# Patient Record
Sex: MALE | Race: OTHER | HISPANIC OR LATINO | URBAN - METROPOLITAN AREA
[De-identification: names, ages, dates, MRNs, and addresses within clinical notes are randomized per-mention and may not be internally consistent; named-entity substitution may affect disease eponyms.]

---

## 2019-05-09 ENCOUNTER — EMERGENCY (EMERGENCY)
Facility: HOSPITAL | Age: 44
LOS: 1 days | Discharge: HOME | End: 2019-05-09
Attending: EMERGENCY MEDICINE
Payer: MEDICARE

## 2019-05-09 VITALS
SYSTOLIC BLOOD PRESSURE: 115 MMHG | OXYGEN SATURATION: 99 % | RESPIRATION RATE: 18 BRPM | HEART RATE: 71 BPM | DIASTOLIC BLOOD PRESSURE: 71 MMHG | TEMPERATURE: 98 F

## 2019-05-09 PROCEDURE — 71046 X-RAY EXAM CHEST 2 VIEWS: CPT | Mod: 26

## 2019-05-09 PROCEDURE — 71046 X-RAY EXAM CHEST 2 VIEWS: CPT

## 2019-05-09 PROCEDURE — 99283 EMERGENCY DEPT VISIT LOW MDM: CPT

## 2019-05-09 PROCEDURE — 99283 EMERGENCY DEPT VISIT LOW MDM: CPT | Mod: 25

## 2019-05-09 RX ORDER — CETIRIZINE HYDROCHLORIDE 10 MG/1
1 TABLET ORAL
Qty: 7 | Refills: 0
Start: 2019-05-09 | End: 2019-05-15

## 2019-05-09 RX ORDER — FLUTICASONE PROPIONATE 50 MCG
1 SPRAY, SUSPENSION NASAL
Qty: 1 | Refills: 0
Start: 2019-05-09 | End: 2019-06-07

## 2019-05-09 RX ORDER — LORATADINE 10 MG/1
10 TABLET ORAL ONCE
Refills: 0 | Status: COMPLETED | OUTPATIENT
Start: 2019-05-09 | End: 2019-05-09

## 2019-05-09 RX ADMIN — LORATADINE 10 MILLIGRAM(S): 10 TABLET ORAL at 18:01

## 2019-05-09 NOTE — ED STATDOCS - PHYSICAL EXAMINATION
VITAL SIGNS: I have reviewed nursing notes and confirm.  CONSTITUTIONAL: Well-developed; well-nourished; in no acute distress.  SKIN: Skin exam is warm and dry, no acute rash.  HEAD: Normocephalic; atraumatic.  EYES: PERRL, EOM intact; conjunctiva and sclera clear.  ENT: (+) nasal discharge; airway clear. Throat clear.  NECK: Supple; non tender.    CARD: S1, S2 normal; no murmurs, gallops, or rubs. Regular rate and rhythm.  RESP: No wheezes,  no rales or rhonchi.   ABD:  soft; non-distended; non-tender;   EXT: Normal ROM. No clubbing, cyanosis or edema.  NEURO: Alert, oriented. Grossly unremarkable. No focal deficits. no facial droop, moves all extremities,    PSYCH: Cooperative, appropriate. VITAL SIGNS: I have reviewed nursing notes and confirm.  CONSTITUTIONAL: Well-developed; well-nourished; in no acute distress.  SKIN: Skin exam is warm and dry, no acute rash.  HEAD: Normocephalic; atraumatic.  EYES: PERRL, EOM intact; conjunctiva and sclera clear. (-) florescene take up.   ENT: (+) nasal discharge; airway clear. Throat clear.  NECK: Supple; non tender.    CARD: S1, S2 normal; no murmurs, gallops, or rubs. Regular rate and rhythm.  RESP: No wheezes,  no rales or rhonchi.   ABD:  soft; non-distended; non-tender;   EXT: Normal ROM. No clubbing, cyanosis or edema.  NEURO: Alert, oriented. Grossly unremarkable. No focal deficits. no facial droop, moves all extremities,    PSYCH: Cooperative, appropriate.

## 2019-05-09 NOTE — ED STATDOCS - OBJECTIVE STATEMENT
44 yo M deaf p/w 1.5 months of itchy and painful eye, running nose, yellow/green/black productive cough. No fever at at home. symptoms not relieved with over the counter cold medication. 2 weeks ago, patient states that something might have flown into his eyes. Family report that he has frequent episode like this throughout the year. He doesn't follow up with a PMD and allergist. No abdominal pain, no nausea, no vomting.     hx translated by deaf  at bedside. 44 yo M deaf p/w 1.5 months of itchy and painful eye, running nose, yellow/green/black productive cough. No fever at at home. symptoms not relieved with over the counter cold medication. 2 weeks ago, patient states that something might have flown into his eyes. Family report that he has frequent episode like this throughout the year. He doesn't follow up with a PMD and allergist. No abdominal pain, no nausea, no vomiting.     hx translated by deaf  at bedside.

## 2019-05-09 NOTE — ED STATDOCS - ATTENDING CONTRIBUTION TO CARE
I, Chris Ross, performed the initial face to face bedside interview with this patient regarding history of present illness, review of symptoms and relevant past medical, social and family history.  I completed an independent physical examination.  I was the initial provider who evaluated this patient. I have signed out the follow up of any pending tests (i.e. labs, radiological studies) to the ACP.  I have communicated the patient’s plan of care and disposition with the ACP.

## 2019-05-09 NOTE — ED STATDOCS - CLINICAL SUMMARY MEDICAL DECISION MAKING FREE TEXT BOX
42 yo M with b/l itchy eyes, running nose, cough, likely seasonal allergies. Will get CXR for productive cough. Will florescene eye to r/o corneal abrasion. Claritin ordered. 42 yo M with b/l itchy eyes, running nose, cough, likely seasonal allergies. Will get CXR for productive cough. No florescene up take. Claritin ordered.

## 2019-05-09 NOTE — ED STATDOCS - NS ED ROS FT
Review of Systems  •	CONSTITUTIONAL - no  fever, no diaphoresis, no weight change  •	SKIN - no rash  •	HEMATOLOGIC - no bleeding, no bruising  •	EYES - (+) eye pain, no blurred vision (+) itchy eyes   •	ENT - no change in hearing, no pain  •	RESPIRATORY - no shortness of breath, (+) cough  •	CARDIAC - no chest pain, no palpitations  •	GI - no abd pain, no nausea, no vomiting, no diarrhea, no constipation, no bleeding  •	GENITO-URINARY - no discharge, no dysuria; no hematuria,   •	ENDO - no polydypsia, no polyurea, no heat/no cold intolerance  •	MUSCULOSKELETAL - no joint pain, no swelling, no redness  •	NEUROLOGIC - no weakness, no headache, no anesthesia, no paresthesias  •	PSYCH - no anxiety, non suicidal, non homicidal, no hallucination, no depression

## 2019-08-29 NOTE — ED ADULT TRIAGE NOTE - ESI TRIAGE ACUITY LEVEL, MLM
Continuity of Care Form    Patient Name: Gil Linn   :  1974  MRN:  [id number]    Admit date:  2019  Discharge date:  ***    Code Status Order: Prior   Advance Directives:   [facility] Documentation     Date/Time Healthcare Directive Type of Healthcare Directive Copy in [address] Agent's Name Healthcare Agent's Phone Number    [phone]  Yes, patient has an advance directive for healthcare treatment  Living will  No, copy requested from family  --  --  --          Admitting Physician:  Kia Butler MD  PCP: Maya Malik MD    Discharging Nurse: [facility] Unit/Room#: 5345/9960-57  Discharging Unit Phone Number: ***    Emergency Contact:   Extended Emergency Contact Information  Primary Emergency Contact: [facility], [facility]  Address: [place], [place], [address] [place] [address] Phone: [phone]  Mobile Phone: [phone]  Relation: Spouse    Past Surgical History:  Past Surgical History:   Procedure Laterality Date    BRONCHOSCOPY N/A 2019    BRONCHOSCOPY ALVEOLAR LAVAGE performed by Yuly Owen MD at [address] [place] [address]  2019    BRONCHOSCOPY [facility] performed by Yuly Owen MD at [facility]  2019    POBA and cutting balloon to Diag 1   [facility]      now dentures upper and lower    PTCA  10/15/2018    BMS- 2.25 x 18 to Diag 1       Immunization History: There is no immunization history on file for this patient.     Active Problems:  Patient Active Problem List   Diagnosis Code    IgA nephropathy N02.8    Anxiety F41.9    HTN (hypertension) I10    Contusion of toe S90.129A    Tobacco use disorder F17.200    Lumbar disc disease M51.9    Arthritis of shoulder region, degenerative M19.019    DDD (degenerative disc disease), lumbosacral M51.37    hours ending 19 2233  No intake/output data recorded.     Safety Concerns:     [address] SCH[address]TE Safety Concerns:499872323}    Impairments/Disabilities:      [address] FRANCA Impairments/Disabilities:065504389}    Nutrition Therapy:  Current Nutrition Therapy:   [address] FRANCA Diet List:715971587}    Routes of Feeding: {CHP DME Other Feedings:316450771}  Liquids: {Slp liquid thickness:78666}  Daily Fluid Restriction: {CHP DME Yes amt example:400819746}  Last Modified Barium Swallow with Video (Video Swallowing Test): {Done Not Done NNR}    Treatments at the Time of Hospital Discharge:   Respiratory Treatments: ***  Oxygen Therapy:  {Therapy; copd oxygen:61856}  Ventilator:    { CC Vent ILQD:092159888}    Rehab Therapies: {THERAPEUTIC INTERVENTION:8217033009}  Weight Bearing Status/Restrictions: Lilly Larson  Weight Bearin}  Other Medical Equipment (for information only, NOT a DME order):  {EQUIPMENT:598801715}  Other Treatments: ***    Patient's personal belongings (please select all that are sent with patient):  {CHP DME Belongings:545618779}    RN SIGNATURE:  {Esignature:937745127}    CASE MANAGEMENT/SOCIAL WORK SECTION    Inpatient Status Date: ***    Readmission Risk Assessment Score:  Readmission Risk              Risk of Unplanned Readmission:        0           Discharging to Facility/ Agency   · Name:   · Address:  · Phone:  · Fax:    Dialysis Facility (if applicable)   · Name:  · Address:  · Dialysis Schedule:  · Phone:  · Fax:    / signature: {Esignature:496426124}    PHYSICIAN SECTION    Prognosis: {Prognosis:5693484340}    Condition at Discharge: [address] Larson Patient Condition:248154685}    Rehab Potential (if transferring to Rehab): {Prognosis:0281195003}    Recommended Labs or Other Treatments After Discharge: ***    Physician Certification: I certify the above information and transfer of Mine Avery  is necessary for the continuing treatment of the diagnosis listed and that he 4

## 2020-10-30 ENCOUNTER — EMERGENCY (EMERGENCY)
Facility: HOSPITAL | Age: 45
LOS: 1 days | Discharge: HOME | End: 2020-10-30
Attending: STUDENT IN AN ORGANIZED HEALTH CARE EDUCATION/TRAINING PROGRAM
Payer: SELF-PAY

## 2020-10-30 VITALS
HEIGHT: 66 IN | DIASTOLIC BLOOD PRESSURE: 85 MMHG | RESPIRATION RATE: 20 BRPM | HEART RATE: 80 BPM | TEMPERATURE: 98 F | OXYGEN SATURATION: 95 % | WEIGHT: 210.1 LBS | SYSTOLIC BLOOD PRESSURE: 151 MMHG

## 2020-10-30 PROCEDURE — 99284 EMERGENCY DEPT VISIT MOD MDM: CPT | Mod: 25

## 2020-10-30 PROCEDURE — 70450 CT HEAD/BRAIN W/O DYE: CPT

## 2020-10-30 PROCEDURE — 73590 X-RAY EXAM OF LOWER LEG: CPT | Mod: 26,RT

## 2020-10-30 PROCEDURE — 70450 CT HEAD/BRAIN W/O DYE: CPT | Mod: 26

## 2020-10-30 PROCEDURE — 73030 X-RAY EXAM OF SHOULDER: CPT

## 2020-10-30 PROCEDURE — 99285 EMERGENCY DEPT VISIT HI MDM: CPT

## 2020-10-30 PROCEDURE — 73590 X-RAY EXAM OF LOWER LEG: CPT

## 2020-10-30 PROCEDURE — 73030 X-RAY EXAM OF SHOULDER: CPT | Mod: 26,LT

## 2020-10-30 RX ORDER — ACETAMINOPHEN 500 MG
650 TABLET ORAL ONCE
Refills: 0 | Status: COMPLETED | OUTPATIENT
Start: 2020-10-30 | End: 2020-10-30

## 2020-10-30 RX ADMIN — Medication 650 MILLIGRAM(S): at 21:57
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
## 2020-10-30 NOTE — ED ADULT NURSE NOTE - OBJECTIVE STATEMENT
Pt complaining of lower extremity and head pain. Pt denies chest pain SOB. Family member used for interpretation Pt is deaf mute. Medications given as per orders will continue to monitor. Pt breathing even and unlabored. Pt in no acute distress.

## 2020-10-30 NOTE — ED ADULT TRIAGE NOTE - NS ED NURSE BANDS TYPE
Dr. Stewart- Is there anything specific you would like this patient to do? She just wanted to update you.   Name band;

## 2020-10-30 NOTE — ED PROVIDER NOTE - PATIENT PORTAL LINK FT
You can access the FollowMyHealth Patient Portal offered by [facility] by registering at the following website: [email or web address]. By joining [employer]’s FollowMyHealth portal, you will also be able to view your health information using other applications (apps) compatible with our system.
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
## 2020-10-30 NOTE — ED PROVIDER NOTE - PHYSICAL EXAMINATION
General: no fever, A&O x 3  HEENT: airway patent  Respiratory: lungs CTA bilaterally  Cardiac: S1S2, regular rate and rhythm  Abdomen: abdomen non-tender  Musculoskeletal: no midline back tenderness, no C-spine tenderness, full ROM, no deformity, + tenderness of right lower tibia  Neuro: 5/5 motor strength in all extremities, CN II-XII intact  Skin: no lesions or rashes

## 2020-10-31 VITALS
HEART RATE: 85 BPM | SYSTOLIC BLOOD PRESSURE: 124 MMHG | DIASTOLIC BLOOD PRESSURE: 78 MMHG | RESPIRATION RATE: 20 BRPM | OXYGEN SATURATION: 100 % | TEMPERATURE: 98 F

## 2021-01-26 NOTE — ED ADULT NURSE NOTE - CHIEF COMPLAINT
The patient is a 45y Male complaining of fall. Island Pedicle Flap With Canthal Suspension Text: The defect edges were debeveled with a #15 scalpel blade.  Given the location of the defect, shape of the defect and the proximity to free margins an island pedicle advancement flap was deemed most appropriate.  Using a sterile surgical marker, an appropriate advancement flap was drawn incorporating the defect, outlining the appropriate donor tissue and placing the expected incisions within the relaxed skin tension lines where possible. The area thus outlined was incised deep to adipose tissue with a #15 scalpel blade.  The skin margins were undermined to an appropriate distance in all directions around the primary defect and laterally outward around the island pedicle utilizing iris scissors.  There was minimal undermining beneath the pedicle flap. A suspension suture was placed in the canthal tendon to prevent tension and prevent ectropion.

## 2023-01-01 ENCOUNTER — INPATIENT (INPATIENT)
Facility: HOSPITAL | Age: 48
LOS: 3 days | DRG: 207 | End: 2023-01-17
Attending: HOSPITALIST | Admitting: HOSPITALIST
Payer: COMMERCIAL

## 2023-01-01 VITALS
OXYGEN SATURATION: 58 % | HEART RATE: 64 BPM | SYSTOLIC BLOOD PRESSURE: 171 MMHG | DIASTOLIC BLOOD PRESSURE: 144 MMHG | RESPIRATION RATE: 16 BRPM

## 2023-01-01 VITALS
DIASTOLIC BLOOD PRESSURE: 59 MMHG | RESPIRATION RATE: 16 BRPM | HEART RATE: 71 BPM | SYSTOLIC BLOOD PRESSURE: 81 MMHG | OXYGEN SATURATION: 95 %

## 2023-01-01 DIAGNOSIS — I46.9 CARDIAC ARREST, CAUSE UNSPECIFIED: ICD-10-CM

## 2023-01-01 LAB
A1C WITH ESTIMATED AVERAGE GLUCOSE RESULT: 5.8 % — HIGH (ref 4–5.6)
ALBUMIN SERPL ELPH-MCNC: 2.5 G/DL — LOW (ref 3.3–5.2)
ALBUMIN SERPL ELPH-MCNC: 2.6 G/DL — LOW (ref 3.3–5.2)
ALBUMIN SERPL ELPH-MCNC: 2.6 G/DL — LOW (ref 3.3–5.2)
ALBUMIN SERPL ELPH-MCNC: 2.8 G/DL — LOW (ref 3.3–5.2)
ALBUMIN SERPL ELPH-MCNC: 3.2 G/DL — LOW (ref 3.3–5.2)
ALBUMIN SERPL ELPH-MCNC: 4 G/DL — SIGNIFICANT CHANGE UP (ref 3.3–5.2)
ALBUMIN SERPL ELPH-MCNC: 4 G/DL — SIGNIFICANT CHANGE UP (ref 3.3–5.2)
ALP SERPL-CCNC: 101 U/L — SIGNIFICANT CHANGE UP (ref 40–120)
ALP SERPL-CCNC: 110 U/L — SIGNIFICANT CHANGE UP (ref 40–120)
ALP SERPL-CCNC: 125 U/L — HIGH (ref 40–120)
ALP SERPL-CCNC: 287 U/L — HIGH (ref 40–120)
ALP SERPL-CCNC: 69 U/L — SIGNIFICANT CHANGE UP (ref 40–120)
ALP SERPL-CCNC: 86 U/L — SIGNIFICANT CHANGE UP (ref 40–120)
ALP SERPL-CCNC: 98 U/L — SIGNIFICANT CHANGE UP (ref 40–120)
ALT FLD-CCNC: 1054 U/L — HIGH
ALT FLD-CCNC: 1058 U/L — HIGH
ALT FLD-CCNC: 1129 U/L — HIGH
ALT FLD-CCNC: 1277 U/L — HIGH
ALT FLD-CCNC: 224 U/L — HIGH
ALT FLD-CCNC: 586 U/L — HIGH
ALT FLD-CCNC: 878 U/L — HIGH
ALT FLD-CCNC: 932 U/L — HIGH
ALT FLD-CCNC: 973 U/L — HIGH
AMPHET UR-MCNC: NEGATIVE — SIGNIFICANT CHANGE UP
ANION GAP SERPL CALC-SCNC: 11 MMOL/L — SIGNIFICANT CHANGE UP (ref 5–17)
ANION GAP SERPL CALC-SCNC: 15 MMOL/L — SIGNIFICANT CHANGE UP (ref 5–17)
ANION GAP SERPL CALC-SCNC: 15 MMOL/L — SIGNIFICANT CHANGE UP (ref 5–17)
ANION GAP SERPL CALC-SCNC: 16 MMOL/L — SIGNIFICANT CHANGE UP (ref 5–17)
ANION GAP SERPL CALC-SCNC: 16 MMOL/L — SIGNIFICANT CHANGE UP (ref 5–17)
ANION GAP SERPL CALC-SCNC: 18 MMOL/L — HIGH (ref 5–17)
ANION GAP SERPL CALC-SCNC: 19 MMOL/L — HIGH (ref 5–17)
ANION GAP SERPL CALC-SCNC: 22 MMOL/L — HIGH (ref 5–17)
ANION GAP SERPL CALC-SCNC: 26 MMOL/L — HIGH (ref 5–17)
ANION GAP SERPL CALC-SCNC: 37 MMOL/L — HIGH (ref 5–17)
APPEARANCE UR: CLEAR — SIGNIFICANT CHANGE UP
APTT BLD: 53.3 SEC — HIGH (ref 27.5–35.5)
AST SERPL-CCNC: 1015 U/L — HIGH
AST SERPL-CCNC: 1066 U/L — HIGH
AST SERPL-CCNC: 1139 U/L — HIGH
AST SERPL-CCNC: 1196 U/L — HIGH
AST SERPL-CCNC: 1416 U/L — HIGH
AST SERPL-CCNC: 192 U/L — HIGH
AST SERPL-CCNC: 225 U/L — HIGH
AST SERPL-CCNC: 457 U/L — HIGH
AST SERPL-CCNC: 529 U/L — HIGH
BACTERIA # UR AUTO: NEGATIVE — SIGNIFICANT CHANGE UP
BARBITURATES UR SCN-MCNC: NEGATIVE — SIGNIFICANT CHANGE UP
BASE EXCESS BLDV CALC-SCNC: SIGNIFICANT CHANGE UP MMOL/L (ref -2–3)
BASOPHILS # BLD AUTO: 0 K/UL — SIGNIFICANT CHANGE UP (ref 0–0.2)
BASOPHILS # BLD AUTO: 0 K/UL — SIGNIFICANT CHANGE UP (ref 0–0.2)
BASOPHILS NFR BLD AUTO: 0 % — SIGNIFICANT CHANGE UP (ref 0–2)
BASOPHILS NFR BLD AUTO: 0 % — SIGNIFICANT CHANGE UP (ref 0–2)
BENZODIAZ UR-MCNC: NEGATIVE — SIGNIFICANT CHANGE UP
BILIRUB SERPL-MCNC: 0.4 MG/DL — SIGNIFICANT CHANGE UP (ref 0.4–2)
BILIRUB SERPL-MCNC: 0.9 MG/DL — SIGNIFICANT CHANGE UP (ref 0.4–2)
BILIRUB SERPL-MCNC: 1.4 MG/DL — SIGNIFICANT CHANGE UP (ref 0.4–2)
BILIRUB SERPL-MCNC: 1.7 MG/DL — SIGNIFICANT CHANGE UP (ref 0.4–2)
BILIRUB SERPL-MCNC: 1.9 MG/DL — SIGNIFICANT CHANGE UP (ref 0.4–2)
BILIRUB SERPL-MCNC: 2.2 MG/DL — HIGH (ref 0.4–2)
BILIRUB SERPL-MCNC: 2.8 MG/DL — HIGH (ref 0.4–2)
BILIRUB SERPL-MCNC: 2.9 MG/DL — HIGH (ref 0.4–2)
BILIRUB SERPL-MCNC: <0.2 MG/DL — LOW (ref 0.4–2)
BILIRUB UR-MCNC: NEGATIVE — SIGNIFICANT CHANGE UP
BLD GP AB SCN SERPL QL: SIGNIFICANT CHANGE UP
BUN SERPL-MCNC: 11.4 MG/DL — SIGNIFICANT CHANGE UP (ref 8–20)
BUN SERPL-MCNC: 14.3 MG/DL — SIGNIFICANT CHANGE UP (ref 8–20)
BUN SERPL-MCNC: 27.2 MG/DL — HIGH (ref 8–20)
BUN SERPL-MCNC: 34 MG/DL — HIGH (ref 8–20)
BUN SERPL-MCNC: 37.3 MG/DL — HIGH (ref 8–20)
BUN SERPL-MCNC: 46.2 MG/DL — HIGH (ref 8–20)
BUN SERPL-MCNC: 50.6 MG/DL — HIGH (ref 8–20)
BUN SERPL-MCNC: 63.6 MG/DL — HIGH (ref 8–20)
BUN SERPL-MCNC: 66.4 MG/DL — HIGH (ref 8–20)
BUN SERPL-MCNC: 74.4 MG/DL — HIGH (ref 8–20)
BURR CELLS BLD QL SMEAR: PRESENT — SIGNIFICANT CHANGE UP
CA-I SERPL-SCNC: 1.15 MMOL/L — SIGNIFICANT CHANGE UP (ref 1.15–1.33)
CALCIUM SERPL-MCNC: 6.2 MG/DL — CRITICAL LOW (ref 8.4–10.5)
CALCIUM SERPL-MCNC: 6.4 MG/DL — CRITICAL LOW (ref 8.4–10.5)
CALCIUM SERPL-MCNC: 6.5 MG/DL — CRITICAL LOW (ref 8.4–10.5)
CALCIUM SERPL-MCNC: 6.6 MG/DL — LOW (ref 8.4–10.5)
CALCIUM SERPL-MCNC: 6.7 MG/DL — LOW (ref 8.4–10.5)
CALCIUM SERPL-MCNC: 6.8 MG/DL — LOW (ref 8.4–10.5)
CALCIUM SERPL-MCNC: 7 MG/DL — LOW (ref 8.4–10.5)
CALCIUM SERPL-MCNC: 7.3 MG/DL — LOW (ref 8.4–10.5)
CALCIUM SERPL-MCNC: 7.4 MG/DL — LOW (ref 8.4–10.5)
CALCIUM SERPL-MCNC: 9.2 MG/DL — SIGNIFICANT CHANGE UP (ref 8.4–10.5)
CHLORIDE BLDV-SCNC: 103 MMOL/L — SIGNIFICANT CHANGE UP (ref 96–108)
CHLORIDE SERPL-SCNC: 101 MMOL/L — SIGNIFICANT CHANGE UP (ref 96–108)
CHLORIDE SERPL-SCNC: 103 MMOL/L — SIGNIFICANT CHANGE UP (ref 96–108)
CHLORIDE SERPL-SCNC: 105 MMOL/L — SIGNIFICANT CHANGE UP (ref 96–108)
CHLORIDE SERPL-SCNC: 105 MMOL/L — SIGNIFICANT CHANGE UP (ref 96–108)
CHLORIDE SERPL-SCNC: 106 MMOL/L — SIGNIFICANT CHANGE UP (ref 96–108)
CHLORIDE SERPL-SCNC: 95 MMOL/L — LOW (ref 96–108)
CHLORIDE SERPL-SCNC: 95 MMOL/L — LOW (ref 96–108)
CHLORIDE SERPL-SCNC: 97 MMOL/L — SIGNIFICANT CHANGE UP (ref 96–108)
CHLORIDE SERPL-SCNC: 97 MMOL/L — SIGNIFICANT CHANGE UP (ref 96–108)
CHLORIDE SERPL-SCNC: 99 MMOL/L — SIGNIFICANT CHANGE UP (ref 96–108)
CK SERPL-CCNC: 1188 U/L — HIGH (ref 30–200)
CO2 SERPL-SCNC: 10 MMOL/L — CRITICAL LOW (ref 22–29)
CO2 SERPL-SCNC: 14 MMOL/L — LOW (ref 22–29)
CO2 SERPL-SCNC: 16 MMOL/L — LOW (ref 22–29)
CO2 SERPL-SCNC: 21 MMOL/L — LOW (ref 22–29)
CO2 SERPL-SCNC: 25 MMOL/L — SIGNIFICANT CHANGE UP (ref 22–29)
CO2 SERPL-SCNC: 31 MMOL/L — HIGH (ref 22–29)
CO2 SERPL-SCNC: 33 MMOL/L — HIGH (ref 22–29)
COCAINE METAB.OTHER UR-MCNC: NEGATIVE — SIGNIFICANT CHANGE UP
COLOR SPEC: YELLOW — SIGNIFICANT CHANGE UP
CREAT SERPL-MCNC: 1.97 MG/DL — HIGH (ref 0.5–1.3)
CREAT SERPL-MCNC: 2.05 MG/DL — HIGH (ref 0.5–1.3)
CREAT SERPL-MCNC: 2.37 MG/DL — HIGH (ref 0.5–1.3)
CREAT SERPL-MCNC: 2.67 MG/DL — HIGH (ref 0.5–1.3)
CREAT SERPL-MCNC: 2.95 MG/DL — HIGH (ref 0.5–1.3)
CREAT SERPL-MCNC: 3.66 MG/DL — HIGH (ref 0.5–1.3)
CREAT SERPL-MCNC: 4.25 MG/DL — HIGH (ref 0.5–1.3)
CREAT SERPL-MCNC: 5.02 MG/DL — HIGH (ref 0.5–1.3)
CREAT SERPL-MCNC: 5.03 MG/DL — HIGH (ref 0.5–1.3)
CREAT SERPL-MCNC: 5.86 MG/DL — HIGH (ref 0.5–1.3)
CULTURE RESULTS: NO GROWTH — SIGNIFICANT CHANGE UP
DIFF PNL FLD: ABNORMAL
EGFR: 11 ML/MIN/1.73M2 — LOW
EGFR: 13 ML/MIN/1.73M2 — LOW
EGFR: 13 ML/MIN/1.73M2 — LOW
EGFR: 16 ML/MIN/1.73M2 — LOW
EGFR: 19 ML/MIN/1.73M2 — LOW
EGFR: 25 ML/MIN/1.73M2 — LOW
EGFR: 28 ML/MIN/1.73M2 — LOW
EGFR: 33 ML/MIN/1.73M2 — LOW
EGFR: 39 ML/MIN/1.73M2 — LOW
EGFR: 41 ML/MIN/1.73M2 — LOW
EOSINOPHIL # BLD AUTO: 0 K/UL — SIGNIFICANT CHANGE UP (ref 0–0.5)
EOSINOPHIL # BLD AUTO: 0.11 K/UL — SIGNIFICANT CHANGE UP (ref 0–0.5)
EOSINOPHIL NFR BLD AUTO: 0 % — SIGNIFICANT CHANGE UP (ref 0–6)
EOSINOPHIL NFR BLD AUTO: 0.9 % — SIGNIFICANT CHANGE UP (ref 0–6)
EPI CELLS # UR: NEGATIVE — SIGNIFICANT CHANGE UP
ESTIMATED AVERAGE GLUCOSE: 120 MG/DL — HIGH (ref 68–114)
ETHANOL SERPL-MCNC: 286 MG/DL — HIGH (ref 0–9)
GAS PNL BLDA: SIGNIFICANT CHANGE UP
GAS PNL BLDV: 144 MMOL/L — SIGNIFICANT CHANGE UP (ref 136–145)
GAS PNL BLDV: SIGNIFICANT CHANGE UP
GLUCOSE BLDC GLUCOMTR-MCNC: 104 MG/DL — HIGH (ref 70–99)
GLUCOSE BLDC GLUCOMTR-MCNC: 134 MG/DL — HIGH (ref 70–99)
GLUCOSE BLDC GLUCOMTR-MCNC: 138 MG/DL — HIGH (ref 70–99)
GLUCOSE BLDC GLUCOMTR-MCNC: 140 MG/DL — HIGH (ref 70–99)
GLUCOSE BLDC GLUCOMTR-MCNC: 148 MG/DL — HIGH (ref 70–99)
GLUCOSE BLDC GLUCOMTR-MCNC: 149 MG/DL — HIGH (ref 70–99)
GLUCOSE BLDC GLUCOMTR-MCNC: 154 MG/DL — HIGH (ref 70–99)
GLUCOSE BLDC GLUCOMTR-MCNC: 156 MG/DL — HIGH (ref 70–99)
GLUCOSE BLDC GLUCOMTR-MCNC: 172 MG/DL — HIGH (ref 70–99)
GLUCOSE BLDC GLUCOMTR-MCNC: 172 MG/DL — HIGH (ref 70–99)
GLUCOSE BLDC GLUCOMTR-MCNC: 177 MG/DL — HIGH (ref 70–99)
GLUCOSE BLDC GLUCOMTR-MCNC: 184 MG/DL — HIGH (ref 70–99)
GLUCOSE BLDC GLUCOMTR-MCNC: 185 MG/DL — HIGH (ref 70–99)
GLUCOSE BLDC GLUCOMTR-MCNC: 186 MG/DL — HIGH (ref 70–99)
GLUCOSE BLDC GLUCOMTR-MCNC: 189 MG/DL — HIGH (ref 70–99)
GLUCOSE BLDC GLUCOMTR-MCNC: 193 MG/DL — HIGH (ref 70–99)
GLUCOSE BLDC GLUCOMTR-MCNC: 195 MG/DL — HIGH (ref 70–99)
GLUCOSE BLDC GLUCOMTR-MCNC: 201 MG/DL — HIGH (ref 70–99)
GLUCOSE BLDC GLUCOMTR-MCNC: 201 MG/DL — HIGH (ref 70–99)
GLUCOSE BLDC GLUCOMTR-MCNC: 202 MG/DL — HIGH (ref 70–99)
GLUCOSE BLDC GLUCOMTR-MCNC: 205 MG/DL — HIGH (ref 70–99)
GLUCOSE BLDC GLUCOMTR-MCNC: 205 MG/DL — HIGH (ref 70–99)
GLUCOSE BLDC GLUCOMTR-MCNC: 217 MG/DL — HIGH (ref 70–99)
GLUCOSE BLDC GLUCOMTR-MCNC: 237 MG/DL — HIGH (ref 70–99)
GLUCOSE BLDC GLUCOMTR-MCNC: 249 MG/DL — HIGH (ref 70–99)
GLUCOSE BLDC GLUCOMTR-MCNC: 258 MG/DL — HIGH (ref 70–99)
GLUCOSE BLDC GLUCOMTR-MCNC: 265 MG/DL — HIGH (ref 70–99)
GLUCOSE BLDC GLUCOMTR-MCNC: 270 MG/DL — HIGH (ref 70–99)
GLUCOSE BLDC GLUCOMTR-MCNC: 290 MG/DL — HIGH (ref 70–99)
GLUCOSE BLDC GLUCOMTR-MCNC: 293 MG/DL — HIGH (ref 70–99)
GLUCOSE BLDC GLUCOMTR-MCNC: 310 MG/DL — HIGH (ref 70–99)
GLUCOSE BLDC GLUCOMTR-MCNC: 318 MG/DL — HIGH (ref 70–99)
GLUCOSE BLDC GLUCOMTR-MCNC: 334 MG/DL — HIGH (ref 70–99)
GLUCOSE BLDV-MCNC: 360 MG/DL — HIGH (ref 70–99)
GLUCOSE SERPL-MCNC: 155 MG/DL — HIGH (ref 70–99)
GLUCOSE SERPL-MCNC: 180 MG/DL — HIGH (ref 70–99)
GLUCOSE SERPL-MCNC: 195 MG/DL — HIGH (ref 70–99)
GLUCOSE SERPL-MCNC: 227 MG/DL — HIGH (ref 70–99)
GLUCOSE SERPL-MCNC: 231 MG/DL — HIGH (ref 70–99)
GLUCOSE SERPL-MCNC: 263 MG/DL — HIGH (ref 70–99)
GLUCOSE SERPL-MCNC: 264 MG/DL — HIGH (ref 70–99)
GLUCOSE SERPL-MCNC: 327 MG/DL — HIGH (ref 70–99)
GLUCOSE SERPL-MCNC: 338 MG/DL — HIGH (ref 70–99)
GLUCOSE SERPL-MCNC: 356 MG/DL — HIGH (ref 70–99)
GLUCOSE UR QL: NEGATIVE MG/DL — SIGNIFICANT CHANGE UP
HAV IGM SER-ACNC: SIGNIFICANT CHANGE UP
HBV CORE IGM SER-ACNC: SIGNIFICANT CHANGE UP
HBV SURFACE AG SER-ACNC: SIGNIFICANT CHANGE UP
HCO3 BLDV-SCNC: SIGNIFICANT CHANGE UP MMOL/L (ref 22–29)
HCT VFR BLD CALC: 36 % — LOW (ref 39–50)
HCT VFR BLD CALC: 36.8 % — LOW (ref 39–50)
HCT VFR BLD CALC: 37.2 % — LOW (ref 39–50)
HCT VFR BLD CALC: 42.9 % — SIGNIFICANT CHANGE UP (ref 39–50)
HCT VFR BLD CALC: 45 % — SIGNIFICANT CHANGE UP (ref 39–50)
HCT VFR BLD CALC: 49.2 % — SIGNIFICANT CHANGE UP (ref 39–50)
HCV AB S/CO SERPL IA: 0.13 S/CO — SIGNIFICANT CHANGE UP (ref 0–0.99)
HCV AB SERPL-IMP: SIGNIFICANT CHANGE UP
HGB BLD-MCNC: 12.3 G/DL — LOW (ref 13–17)
HGB BLD-MCNC: 12.9 G/DL — LOW (ref 13–17)
HGB BLD-MCNC: 13.2 G/DL — SIGNIFICANT CHANGE UP (ref 13–17)
HGB BLD-MCNC: 15.3 G/DL — SIGNIFICANT CHANGE UP (ref 13–17)
HGB BLD-MCNC: 15.4 G/DL — SIGNIFICANT CHANGE UP (ref 13–17)
HGB BLD-MCNC: 15.9 G/DL — SIGNIFICANT CHANGE UP (ref 13–17)
HIV 1 & 2 AB SERPL IA.RAPID: SIGNIFICANT CHANGE UP
INR BLD: 1.12 RATIO — SIGNIFICANT CHANGE UP (ref 0.88–1.16)
KETONES UR-MCNC: NEGATIVE — SIGNIFICANT CHANGE UP
LACTATE BLDV-MCNC: >16.5 MMOL/L — CRITICAL HIGH (ref 0.5–2)
LACTATE SERPL-SCNC: 3.5 MMOL/L — HIGH (ref 0.5–2)
LACTATE SERPL-SCNC: 4 MMOL/L — CRITICAL HIGH (ref 0.5–2)
LACTATE SERPL-SCNC: 4.5 MMOL/L — CRITICAL HIGH (ref 0.5–2)
LACTATE SERPL-SCNC: 4.6 MMOL/L — CRITICAL HIGH (ref 0.5–2)
LACTATE SERPL-SCNC: 5 MMOL/L — CRITICAL HIGH (ref 0.5–2)
LACTATE SERPL-SCNC: 5.2 MMOL/L — CRITICAL HIGH (ref 0.5–2)
LACTATE SERPL-SCNC: 5.7 MMOL/L — CRITICAL HIGH (ref 0.5–2)
LACTATE SERPL-SCNC: 8 MMOL/L — CRITICAL HIGH (ref 0.5–2)
LACTATE SERPL-SCNC: 8.3 MMOL/L — CRITICAL HIGH (ref 0.5–2)
LACTATE SERPL-SCNC: 8.4 MMOL/L — CRITICAL HIGH (ref 0.5–2)
LEUKOCYTE ESTERASE UR-ACNC: NEGATIVE — SIGNIFICANT CHANGE UP
LYMPHOCYTES # BLD AUTO: 0.46 K/UL — LOW (ref 1–3.3)
LYMPHOCYTES # BLD AUTO: 3.6 % — LOW (ref 13–44)
LYMPHOCYTES # BLD AUTO: 41.2 % — SIGNIFICANT CHANGE UP (ref 13–44)
LYMPHOCYTES # BLD AUTO: 6.61 K/UL — HIGH (ref 1–3.3)
MAGNESIUM SERPL-MCNC: 1.5 MG/DL — LOW (ref 1.6–2.6)
MAGNESIUM SERPL-MCNC: 1.5 MG/DL — LOW (ref 1.6–2.6)
MAGNESIUM SERPL-MCNC: 1.8 MG/DL — SIGNIFICANT CHANGE UP (ref 1.6–2.6)
MAGNESIUM SERPL-MCNC: 2.3 MG/DL — SIGNIFICANT CHANGE UP (ref 1.6–2.6)
MAGNESIUM SERPL-MCNC: 2.8 MG/DL — HIGH (ref 1.6–2.6)
MANUAL SMEAR VERIFICATION: SIGNIFICANT CHANGE UP
MCHC RBC-ENTMCNC: 31.3 GM/DL — LOW (ref 32–36)
MCHC RBC-ENTMCNC: 33.2 PG — SIGNIFICANT CHANGE UP (ref 27–34)
MCHC RBC-ENTMCNC: 33.4 PG — SIGNIFICANT CHANGE UP (ref 27–34)
MCHC RBC-ENTMCNC: 33.7 PG — SIGNIFICANT CHANGE UP (ref 27–34)
MCHC RBC-ENTMCNC: 33.8 PG — SIGNIFICANT CHANGE UP (ref 27–34)
MCHC RBC-ENTMCNC: 34.2 GM/DL — SIGNIFICANT CHANGE UP (ref 32–36)
MCHC RBC-ENTMCNC: 35.1 GM/DL — SIGNIFICANT CHANGE UP (ref 32–36)
MCHC RBC-ENTMCNC: 35.3 GM/DL — SIGNIFICANT CHANGE UP (ref 32–36)
MCHC RBC-ENTMCNC: 35.5 GM/DL — SIGNIFICANT CHANGE UP (ref 32–36)
MCHC RBC-ENTMCNC: 35.7 GM/DL — SIGNIFICANT CHANGE UP (ref 32–36)
MCV RBC AUTO: 106.7 FL — HIGH (ref 80–100)
MCV RBC AUTO: 93.5 FL — SIGNIFICANT CHANGE UP (ref 80–100)
MCV RBC AUTO: 94.5 FL — SIGNIFICANT CHANGE UP (ref 80–100)
MCV RBC AUTO: 94.8 FL — SIGNIFICANT CHANGE UP (ref 80–100)
MCV RBC AUTO: 95.5 FL — SIGNIFICANT CHANGE UP (ref 80–100)
MCV RBC AUTO: 97.3 FL — SIGNIFICANT CHANGE UP (ref 80–100)
METAMYELOCYTES # FLD: 3.5 % — HIGH (ref 0–0)
METHADONE UR-MCNC: NEGATIVE — SIGNIFICANT CHANGE UP
MONOCYTES # BLD AUTO: 0.23 K/UL — SIGNIFICANT CHANGE UP (ref 0–0.9)
MONOCYTES # BLD AUTO: 0.71 K/UL — SIGNIFICANT CHANGE UP (ref 0–0.9)
MONOCYTES NFR BLD AUTO: 1.8 % — LOW (ref 2–14)
MONOCYTES NFR BLD AUTO: 4.4 % — SIGNIFICANT CHANGE UP (ref 2–14)
MRSA PCR RESULT.: SIGNIFICANT CHANGE UP
MYELOCYTES NFR BLD: 3.5 % — HIGH (ref 0–0)
NEUTROPHILS # BLD AUTO: 11.84 K/UL — HIGH (ref 1.8–7.4)
NEUTROPHILS # BLD AUTO: 7.61 K/UL — HIGH (ref 1.8–7.4)
NEUTROPHILS NFR BLD AUTO: 47.4 % — SIGNIFICANT CHANGE UP (ref 43–77)
NEUTROPHILS NFR BLD AUTO: 92.8 % — HIGH (ref 43–77)
NITRITE UR-MCNC: NEGATIVE — SIGNIFICANT CHANGE UP
NRBC # BLD: 2 /100 — HIGH (ref 0–0)
NT-PROBNP SERPL-SCNC: 80 PG/ML — SIGNIFICANT CHANGE UP (ref 0–300)
OPIATES UR-MCNC: NEGATIVE — SIGNIFICANT CHANGE UP
PCO2 BLDV: 89 MMHG — HIGH (ref 42–55)
PCP SPEC-MCNC: SIGNIFICANT CHANGE UP
PCP UR-MCNC: NEGATIVE — SIGNIFICANT CHANGE UP
PH BLDV: <6.942 — CRITICAL LOW (ref 7.32–7.43)
PH UR: 6 — SIGNIFICANT CHANGE UP (ref 5–8)
PHOSPHATE SERPL-MCNC: 0.6 MG/DL — CRITICAL LOW (ref 2.4–4.7)
PHOSPHATE SERPL-MCNC: 10.1 MG/DL — HIGH (ref 2.4–4.7)
PHOSPHATE SERPL-MCNC: 2.1 MG/DL — LOW (ref 2.4–4.7)
PHOSPHATE SERPL-MCNC: 3 MG/DL — SIGNIFICANT CHANGE UP (ref 2.4–4.7)
PHOSPHATE SERPL-MCNC: 4.5 MG/DL — SIGNIFICANT CHANGE UP (ref 2.4–4.7)
PHOSPHATE SERPL-MCNC: 4.8 MG/DL — HIGH (ref 2.4–4.7)
PHOSPHATE SERPL-MCNC: 5.1 MG/DL — HIGH (ref 2.4–4.7)
PHOSPHATE SERPL-MCNC: 5.3 MG/DL — HIGH (ref 2.4–4.7)
PLAT MORPH BLD: NORMAL — SIGNIFICANT CHANGE UP
PLATELET # BLD AUTO: 137 K/UL — LOW (ref 150–400)
PLATELET # BLD AUTO: 164 K/UL — SIGNIFICANT CHANGE UP (ref 150–400)
PLATELET # BLD AUTO: 184 K/UL — SIGNIFICANT CHANGE UP (ref 150–400)
PLATELET # BLD AUTO: 56 K/UL — LOW (ref 150–400)
PLATELET # BLD AUTO: 61 K/UL — LOW (ref 150–400)
PLATELET # BLD AUTO: 83 K/UL — LOW (ref 150–400)
PO2 BLDV: 117 MMHG — HIGH (ref 25–45)
POLYCHROMASIA BLD QL SMEAR: SIGNIFICANT CHANGE UP
POTASSIUM BLDV-SCNC: 4.6 MMOL/L — SIGNIFICANT CHANGE UP (ref 3.5–5.1)
POTASSIUM SERPL-MCNC: 2.6 MMOL/L — CRITICAL LOW (ref 3.5–5.3)
POTASSIUM SERPL-MCNC: 2.9 MMOL/L — CRITICAL LOW (ref 3.5–5.3)
POTASSIUM SERPL-MCNC: 3 MMOL/L — LOW (ref 3.5–5.3)
POTASSIUM SERPL-MCNC: 3.1 MMOL/L — LOW (ref 3.5–5.3)
POTASSIUM SERPL-MCNC: 3.2 MMOL/L — LOW (ref 3.5–5.3)
POTASSIUM SERPL-MCNC: 3.3 MMOL/L — LOW (ref 3.5–5.3)
POTASSIUM SERPL-MCNC: 3.6 MMOL/L — SIGNIFICANT CHANGE UP (ref 3.5–5.3)
POTASSIUM SERPL-MCNC: 3.8 MMOL/L — SIGNIFICANT CHANGE UP (ref 3.5–5.3)
POTASSIUM SERPL-MCNC: 3.8 MMOL/L — SIGNIFICANT CHANGE UP (ref 3.5–5.3)
POTASSIUM SERPL-MCNC: 4.4 MMOL/L — SIGNIFICANT CHANGE UP (ref 3.5–5.3)
POTASSIUM SERPL-MCNC: 4.7 MMOL/L — SIGNIFICANT CHANGE UP (ref 3.5–5.3)
POTASSIUM SERPL-MCNC: 5 MMOL/L — SIGNIFICANT CHANGE UP (ref 3.5–5.3)
POTASSIUM SERPL-SCNC: 2.6 MMOL/L — CRITICAL LOW (ref 3.5–5.3)
POTASSIUM SERPL-SCNC: 2.9 MMOL/L — CRITICAL LOW (ref 3.5–5.3)
POTASSIUM SERPL-SCNC: 3 MMOL/L — LOW (ref 3.5–5.3)
POTASSIUM SERPL-SCNC: 3.1 MMOL/L — LOW (ref 3.5–5.3)
POTASSIUM SERPL-SCNC: 3.2 MMOL/L — LOW (ref 3.5–5.3)
POTASSIUM SERPL-SCNC: 3.3 MMOL/L — LOW (ref 3.5–5.3)
POTASSIUM SERPL-SCNC: 3.6 MMOL/L — SIGNIFICANT CHANGE UP (ref 3.5–5.3)
POTASSIUM SERPL-SCNC: 3.8 MMOL/L — SIGNIFICANT CHANGE UP (ref 3.5–5.3)
POTASSIUM SERPL-SCNC: 3.8 MMOL/L — SIGNIFICANT CHANGE UP (ref 3.5–5.3)
POTASSIUM SERPL-SCNC: 4.4 MMOL/L — SIGNIFICANT CHANGE UP (ref 3.5–5.3)
POTASSIUM SERPL-SCNC: 4.7 MMOL/L — SIGNIFICANT CHANGE UP (ref 3.5–5.3)
POTASSIUM SERPL-SCNC: 5 MMOL/L — SIGNIFICANT CHANGE UP (ref 3.5–5.3)
PROT SERPL-MCNC: 4.8 G/DL — LOW (ref 6.6–8.7)
PROT SERPL-MCNC: 4.8 G/DL — LOW (ref 6.6–8.7)
PROT SERPL-MCNC: 5.1 G/DL — LOW (ref 6.6–8.7)
PROT SERPL-MCNC: 5.1 G/DL — LOW (ref 6.6–8.7)
PROT SERPL-MCNC: 5.2 G/DL — LOW (ref 6.6–8.7)
PROT SERPL-MCNC: 5.4 G/DL — LOW (ref 6.6–8.7)
PROT SERPL-MCNC: 5.6 G/DL — LOW (ref 6.6–8.7)
PROT SERPL-MCNC: 7.1 G/DL — SIGNIFICANT CHANGE UP (ref 6.6–8.7)
PROT SERPL-MCNC: 7.3 G/DL — SIGNIFICANT CHANGE UP (ref 6.6–8.7)
PROT UR-MCNC: NEGATIVE — SIGNIFICANT CHANGE UP
PROTHROM AB SERPL-ACNC: 13 SEC — SIGNIFICANT CHANGE UP (ref 10.5–13.4)
RBC # BLD: 3.7 M/UL — LOW (ref 4.2–5.8)
RBC # BLD: 3.88 M/UL — LOW (ref 4.2–5.8)
RBC # BLD: 3.98 M/UL — LOW (ref 4.2–5.8)
RBC # BLD: 4.54 M/UL — SIGNIFICANT CHANGE UP (ref 4.2–5.8)
RBC # BLD: 4.61 M/UL — SIGNIFICANT CHANGE UP (ref 4.2–5.8)
RBC # BLD: 4.71 M/UL — SIGNIFICANT CHANGE UP (ref 4.2–5.8)
RBC # FLD: 13.4 % — SIGNIFICANT CHANGE UP (ref 10.3–14.5)
RBC # FLD: 13.4 % — SIGNIFICANT CHANGE UP (ref 10.3–14.5)
RBC # FLD: 13.5 % — SIGNIFICANT CHANGE UP (ref 10.3–14.5)
RBC # FLD: 13.9 % — SIGNIFICANT CHANGE UP (ref 10.3–14.5)
RBC # FLD: 13.9 % — SIGNIFICANT CHANGE UP (ref 10.3–14.5)
RBC # FLD: 14.1 % — SIGNIFICANT CHANGE UP (ref 10.3–14.5)
RBC BLD AUTO: ABNORMAL
RBC CASTS # UR COMP ASSIST: NEGATIVE /HPF — SIGNIFICANT CHANGE UP (ref 0–4)
S AUREUS DNA NOSE QL NAA+PROBE: DETECTED
SAO2 % BLDV: 95.1 % — SIGNIFICANT CHANGE UP
SARS-COV-2 RNA SPEC QL NAA+PROBE: SIGNIFICANT CHANGE UP
SMUDGE CELLS # BLD: PRESENT — SIGNIFICANT CHANGE UP
SODIUM SERPL-SCNC: 141 MMOL/L — SIGNIFICANT CHANGE UP (ref 135–145)
SODIUM SERPL-SCNC: 143 MMOL/L — SIGNIFICANT CHANGE UP (ref 135–145)
SODIUM SERPL-SCNC: 144 MMOL/L — SIGNIFICANT CHANGE UP (ref 135–145)
SODIUM SERPL-SCNC: 145 MMOL/L — SIGNIFICANT CHANGE UP (ref 135–145)
SODIUM SERPL-SCNC: 145 MMOL/L — SIGNIFICANT CHANGE UP (ref 135–145)
SODIUM SERPL-SCNC: 146 MMOL/L — HIGH (ref 135–145)
SODIUM SERPL-SCNC: 147 MMOL/L — HIGH (ref 135–145)
SODIUM SERPL-SCNC: 147 MMOL/L — HIGH (ref 135–145)
SP GR SPEC: 1.01 — SIGNIFICANT CHANGE UP (ref 1.01–1.02)
SPECIMEN SOURCE: SIGNIFICANT CHANGE UP
THC UR QL: NEGATIVE — SIGNIFICANT CHANGE UP
TROPONIN T SERPL-MCNC: 0.11 NG/ML — HIGH (ref 0–0.06)
TROPONIN T SERPL-MCNC: 0.2 NG/ML — HIGH (ref 0–0.06)
UROBILINOGEN FLD QL: NEGATIVE MG/DL — SIGNIFICANT CHANGE UP
WBC # BLD: 12.64 K/UL — HIGH (ref 3.8–10.5)
WBC # BLD: 13.18 K/UL — HIGH (ref 3.8–10.5)
WBC # BLD: 14.18 K/UL — HIGH (ref 3.8–10.5)
WBC # BLD: 16.05 K/UL — HIGH (ref 3.8–10.5)
WBC # BLD: 20.52 K/UL — HIGH (ref 3.8–10.5)
WBC # BLD: 20.91 K/UL — HIGH (ref 3.8–10.5)
WBC # FLD AUTO: 12.64 K/UL — HIGH (ref 3.8–10.5)
WBC # FLD AUTO: 13.18 K/UL — HIGH (ref 3.8–10.5)
WBC # FLD AUTO: 14.18 K/UL — HIGH (ref 3.8–10.5)
WBC # FLD AUTO: 16.05 K/UL — HIGH (ref 3.8–10.5)
WBC # FLD AUTO: 20.52 K/UL — HIGH (ref 3.8–10.5)
WBC # FLD AUTO: 20.91 K/UL — HIGH (ref 3.8–10.5)
WBC UR QL: NEGATIVE /HPF — SIGNIFICANT CHANGE UP (ref 0–5)

## 2023-01-01 PROCEDURE — 80048 BASIC METABOLIC PNL TOTAL CA: CPT

## 2023-01-01 PROCEDURE — 85014 HEMATOCRIT: CPT

## 2023-01-01 PROCEDURE — 96375 TX/PRO/DX INJ NEW DRUG ADDON: CPT | Mod: XU

## 2023-01-01 PROCEDURE — 71250 CT THORAX DX C-: CPT | Mod: 26,MA

## 2023-01-01 PROCEDURE — 71250 CT THORAX DX C-: CPT | Mod: MA

## 2023-01-01 PROCEDURE — 80307 DRUG TEST PRSMV CHEM ANLYZR: CPT

## 2023-01-01 PROCEDURE — 86703 HIV-1/HIV-2 1 RESULT ANTBDY: CPT

## 2023-01-01 PROCEDURE — 95700 EEG CONT REC W/VID EEG TECH: CPT

## 2023-01-01 PROCEDURE — 87640 STAPH A DNA AMP PROBE: CPT

## 2023-01-01 PROCEDURE — 83735 ASSAY OF MAGNESIUM: CPT

## 2023-01-01 PROCEDURE — 96376 TX/PRO/DX INJ SAME DRUG ADON: CPT | Mod: XU

## 2023-01-01 PROCEDURE — 82435 ASSAY OF BLOOD CHLORIDE: CPT

## 2023-01-01 PROCEDURE — 96374 THER/PROPH/DIAG INJ IV PUSH: CPT | Mod: XU

## 2023-01-01 PROCEDURE — 99239 HOSP IP/OBS DSCHRG MGMT >30: CPT

## 2023-01-01 PROCEDURE — 84100 ASSAY OF PHOSPHORUS: CPT

## 2023-01-01 PROCEDURE — 85730 THROMBOPLASTIN TIME PARTIAL: CPT

## 2023-01-01 PROCEDURE — 80053 COMPREHEN METABOLIC PANEL: CPT

## 2023-01-01 PROCEDURE — 93306 TTE W/DOPPLER COMPLETE: CPT | Mod: 26

## 2023-01-01 PROCEDURE — 82962 GLUCOSE BLOOD TEST: CPT

## 2023-01-01 PROCEDURE — 36415 COLL VENOUS BLD VENIPUNCTURE: CPT

## 2023-01-01 PROCEDURE — 95813 EEG EXTND MNTR 61-119 MIN: CPT

## 2023-01-01 PROCEDURE — C1751: CPT

## 2023-01-01 PROCEDURE — 86900 BLOOD TYPING SEROLOGIC ABO: CPT

## 2023-01-01 PROCEDURE — 74176 CT ABD & PELVIS W/O CONTRAST: CPT | Mod: MA

## 2023-01-01 PROCEDURE — 36600 WITHDRAWAL OF ARTERIAL BLOOD: CPT

## 2023-01-01 PROCEDURE — 85027 COMPLETE CBC AUTOMATED: CPT

## 2023-01-01 PROCEDURE — 71045 X-RAY EXAM CHEST 1 VIEW: CPT

## 2023-01-01 PROCEDURE — 83036 HEMOGLOBIN GLYCOSYLATED A1C: CPT

## 2023-01-01 PROCEDURE — 84295 ASSAY OF SERUM SODIUM: CPT

## 2023-01-01 PROCEDURE — 95720 EEG PHY/QHP EA INCR W/VEEG: CPT

## 2023-01-01 PROCEDURE — 99222 1ST HOSP IP/OBS MODERATE 55: CPT

## 2023-01-01 PROCEDURE — 93005 ELECTROCARDIOGRAM TRACING: CPT

## 2023-01-01 PROCEDURE — 36556 INSERT NON-TUNNEL CV CATH: CPT

## 2023-01-01 PROCEDURE — 71045 X-RAY EXAM CHEST 1 VIEW: CPT | Mod: 26

## 2023-01-01 PROCEDURE — 85610 PROTHROMBIN TIME: CPT

## 2023-01-01 PROCEDURE — 99497 ADVNCD CARE PLAN 30 MIN: CPT | Mod: 25

## 2023-01-01 PROCEDURE — 82947 ASSAY GLUCOSE BLOOD QUANT: CPT

## 2023-01-01 PROCEDURE — 86901 BLOOD TYPING SEROLOGIC RH(D): CPT

## 2023-01-01 PROCEDURE — 99291 CRITICAL CARE FIRST HOUR: CPT | Mod: 25

## 2023-01-01 PROCEDURE — 94003 VENT MGMT INPAT SUBQ DAY: CPT

## 2023-01-01 PROCEDURE — C8929: CPT

## 2023-01-01 PROCEDURE — 87641 MR-STAPH DNA AMP PROBE: CPT

## 2023-01-01 PROCEDURE — 70450 CT HEAD/BRAIN W/O DYE: CPT | Mod: MA

## 2023-01-01 PROCEDURE — 82330 ASSAY OF CALCIUM: CPT

## 2023-01-01 PROCEDURE — 84484 ASSAY OF TROPONIN QUANT: CPT

## 2023-01-01 PROCEDURE — 82553 CREATINE MB FRACTION: CPT

## 2023-01-01 PROCEDURE — 85025 COMPLETE CBC W/AUTO DIFF WBC: CPT

## 2023-01-01 PROCEDURE — 85018 HEMOGLOBIN: CPT

## 2023-01-01 PROCEDURE — 86850 RBC ANTIBODY SCREEN: CPT

## 2023-01-01 PROCEDURE — 84132 ASSAY OF SERUM POTASSIUM: CPT

## 2023-01-01 PROCEDURE — U0005: CPT

## 2023-01-01 PROCEDURE — U0003: CPT

## 2023-01-01 PROCEDURE — 87040 BLOOD CULTURE FOR BACTERIA: CPT

## 2023-01-01 PROCEDURE — 83880 ASSAY OF NATRIURETIC PEPTIDE: CPT

## 2023-01-01 PROCEDURE — 81001 URINALYSIS AUTO W/SCOPE: CPT

## 2023-01-01 PROCEDURE — 82550 ASSAY OF CK (CPK): CPT

## 2023-01-01 PROCEDURE — 70450 CT HEAD/BRAIN W/O DYE: CPT | Mod: 26,MA

## 2023-01-01 PROCEDURE — 80074 ACUTE HEPATITIS PANEL: CPT

## 2023-01-01 PROCEDURE — 93010 ELECTROCARDIOGRAM REPORT: CPT

## 2023-01-01 PROCEDURE — 94002 VENT MGMT INPAT INIT DAY: CPT

## 2023-01-01 PROCEDURE — 94760 N-INVAS EAR/PLS OXIMETRY 1: CPT

## 2023-01-01 PROCEDURE — 74176 CT ABD & PELVIS W/O CONTRAST: CPT | Mod: 26,MA

## 2023-01-01 PROCEDURE — 82803 BLOOD GASES ANY COMBINATION: CPT

## 2023-01-01 PROCEDURE — 83605 ASSAY OF LACTIC ACID: CPT

## 2023-01-01 PROCEDURE — 95714 VEEG EA 12-26 HR UNMNTR: CPT

## 2023-01-01 PROCEDURE — 71045 X-RAY EXAM CHEST 1 VIEW: CPT | Mod: 26,77

## 2023-01-01 PROCEDURE — 99232 SBSQ HOSP IP/OBS MODERATE 35: CPT

## 2023-01-01 PROCEDURE — 87086 URINE CULTURE/COLONY COUNT: CPT

## 2023-01-01 PROCEDURE — 95813 EEG EXTND MNTR 61-119 MIN: CPT | Mod: 26

## 2023-01-01 PROCEDURE — 99291 CRITICAL CARE FIRST HOUR: CPT

## 2023-01-01 RX ORDER — POTASSIUM PHOSPHATE, MONOBASIC POTASSIUM PHOSPHATE, DIBASIC 236; 224 MG/ML; MG/ML
15 INJECTION, SOLUTION INTRAVENOUS ONCE
Refills: 0 | Status: COMPLETED | OUTPATIENT
Start: 2023-01-01 | End: 2023-01-01

## 2023-01-01 RX ORDER — PIPERACILLIN AND TAZOBACTAM 4; .5 G/20ML; G/20ML
3.38 INJECTION, POWDER, LYOPHILIZED, FOR SOLUTION INTRAVENOUS ONCE
Refills: 0 | Status: COMPLETED | OUTPATIENT
Start: 2023-01-01 | End: 2023-01-01

## 2023-01-01 RX ORDER — PANTOPRAZOLE SODIUM 20 MG/1
40 TABLET, DELAYED RELEASE ORAL DAILY
Refills: 0 | Status: DISCONTINUED | OUTPATIENT
Start: 2023-01-01 | End: 2023-01-01

## 2023-01-01 RX ORDER — VASOPRESSIN 20 [USP'U]/ML
0.04 INJECTION INTRAVENOUS
Qty: 40 | Refills: 0 | Status: DISCONTINUED | OUTPATIENT
Start: 2023-01-01 | End: 2023-01-01

## 2023-01-01 RX ORDER — SODIUM CHLORIDE 9 MG/ML
1000 INJECTION, SOLUTION INTRAVENOUS
Refills: 0 | Status: DISCONTINUED | OUTPATIENT
Start: 2023-01-01 | End: 2023-01-01

## 2023-01-01 RX ORDER — NOREPINEPHRINE BITARTRATE/D5W 8 MG/250ML
0.05 PLASTIC BAG, INJECTION (ML) INTRAVENOUS
Qty: 16 | Refills: 0 | Status: DISCONTINUED | OUTPATIENT
Start: 2023-01-01 | End: 2023-01-01

## 2023-01-01 RX ORDER — FOLIC ACID 0.8 MG
1 TABLET ORAL DAILY
Refills: 0 | Status: DISCONTINUED | OUTPATIENT
Start: 2023-01-01 | End: 2023-01-01

## 2023-01-01 RX ORDER — INSULIN HUMAN 100 [IU]/ML
8 INJECTION, SOLUTION SUBCUTANEOUS
Qty: 50 | Refills: 0 | Status: DISCONTINUED | OUTPATIENT
Start: 2023-01-01 | End: 2023-01-01

## 2023-01-01 RX ORDER — POTASSIUM CHLORIDE 20 MEQ
40 PACKET (EA) ORAL EVERY 4 HOURS
Refills: 0 | Status: DISCONTINUED | OUTPATIENT
Start: 2023-01-01 | End: 2023-01-01

## 2023-01-01 RX ORDER — POTASSIUM CHLORIDE 20 MEQ
20 PACKET (EA) ORAL
Refills: 0 | Status: COMPLETED | OUTPATIENT
Start: 2023-01-01 | End: 2023-01-01

## 2023-01-01 RX ORDER — MAGNESIUM SULFATE 500 MG/ML
2 VIAL (ML) INJECTION ONCE
Refills: 0 | Status: COMPLETED | OUTPATIENT
Start: 2023-01-01 | End: 2023-01-01

## 2023-01-01 RX ORDER — POTASSIUM CHLORIDE 20 MEQ
10 PACKET (EA) ORAL
Refills: 0 | Status: DISCONTINUED | OUTPATIENT
Start: 2023-01-01 | End: 2023-01-01

## 2023-01-01 RX ORDER — CHLORHEXIDINE GLUCONATE 213 G/1000ML
1 SOLUTION TOPICAL DAILY
Refills: 0 | Status: DISCONTINUED | OUTPATIENT
Start: 2023-01-01 | End: 2023-01-01

## 2023-01-01 RX ORDER — POTASSIUM CHLORIDE 20 MEQ
20 PACKET (EA) ORAL ONCE
Refills: 0 | Status: COMPLETED | OUTPATIENT
Start: 2023-01-01 | End: 2023-01-01

## 2023-01-01 RX ORDER — POTASSIUM CHLORIDE 20 MEQ
10 PACKET (EA) ORAL
Refills: 0 | Status: COMPLETED | OUTPATIENT
Start: 2023-01-01 | End: 2023-01-01

## 2023-01-01 RX ORDER — POTASSIUM CHLORIDE 20 MEQ
20 PACKET (EA) ORAL ONCE
Refills: 0 | Status: DISCONTINUED | OUTPATIENT
Start: 2023-01-01 | End: 2023-01-01

## 2023-01-01 RX ORDER — CHLORHEXIDINE GLUCONATE 213 G/1000ML
15 SOLUTION TOPICAL EVERY 12 HOURS
Refills: 0 | Status: DISCONTINUED | OUTPATIENT
Start: 2023-01-01 | End: 2023-01-01

## 2023-01-01 RX ORDER — NOREPINEPHRINE BITARTRATE/D5W 8 MG/250ML
0.05 PLASTIC BAG, INJECTION (ML) INTRAVENOUS
Qty: 8 | Refills: 0 | Status: DISCONTINUED | OUTPATIENT
Start: 2023-01-01 | End: 2023-01-01

## 2023-01-01 RX ORDER — HEPARIN SODIUM 5000 [USP'U]/ML
5000 INJECTION INTRAVENOUS; SUBCUTANEOUS EVERY 8 HOURS
Refills: 0 | Status: DISCONTINUED | OUTPATIENT
Start: 2023-01-01 | End: 2023-01-01

## 2023-01-01 RX ORDER — THIAMINE MONONITRATE (VIT B1) 100 MG
100 TABLET ORAL DAILY
Refills: 0 | Status: DISCONTINUED | OUTPATIENT
Start: 2023-01-01 | End: 2023-01-01

## 2023-01-01 RX ORDER — INSULIN LISPRO 100/ML
VIAL (ML) SUBCUTANEOUS EVERY 4 HOURS
Refills: 0 | Status: DISCONTINUED | OUTPATIENT
Start: 2023-01-01 | End: 2023-01-01

## 2023-01-01 RX ORDER — SODIUM BICARBONATE 1 MEQ/ML
0.4 SYRINGE (ML) INTRAVENOUS
Qty: 150 | Refills: 0 | Status: DISCONTINUED | OUTPATIENT
Start: 2023-01-01 | End: 2023-01-01

## 2023-01-01 RX ORDER — DEXTROSE 50 % IN WATER 50 %
25 SYRINGE (ML) INTRAVENOUS ONCE
Refills: 0 | Status: DISCONTINUED | OUTPATIENT
Start: 2023-01-01 | End: 2023-01-01

## 2023-01-01 RX ORDER — POTASSIUM CHLORIDE 20 MEQ
40 PACKET (EA) ORAL EVERY 4 HOURS
Refills: 0 | Status: COMPLETED | OUTPATIENT
Start: 2023-01-01 | End: 2023-01-01

## 2023-01-01 RX ORDER — SODIUM CHLORIDE 9 MG/ML
1000 INJECTION, SOLUTION INTRAVENOUS ONCE
Refills: 0 | Status: COMPLETED | OUTPATIENT
Start: 2023-01-01 | End: 2023-01-01

## 2023-01-01 RX ORDER — SODIUM BICARBONATE 1 MEQ/ML
100 SYRINGE (ML) INTRAVENOUS ONCE
Refills: 0 | Status: COMPLETED | OUTPATIENT
Start: 2023-01-01 | End: 2023-01-01

## 2023-01-01 RX ORDER — INSULIN GLARGINE 100 [IU]/ML
20 INJECTION, SOLUTION SUBCUTANEOUS AT BEDTIME
Refills: 0 | Status: DISCONTINUED | OUTPATIENT
Start: 2023-01-01 | End: 2023-01-01

## 2023-01-01 RX ORDER — DEXTROSE 50 % IN WATER 50 %
12.5 SYRINGE (ML) INTRAVENOUS ONCE
Refills: 0 | Status: DISCONTINUED | OUTPATIENT
Start: 2023-01-01 | End: 2023-01-01

## 2023-01-01 RX ORDER — SODIUM CHLORIDE 9 MG/ML
1000 INJECTION INTRAMUSCULAR; INTRAVENOUS; SUBCUTANEOUS ONCE
Refills: 0 | Status: COMPLETED | OUTPATIENT
Start: 2023-01-01 | End: 2023-01-01

## 2023-01-01 RX ORDER — PIPERACILLIN AND TAZOBACTAM 4; .5 G/20ML; G/20ML
3.38 INJECTION, POWDER, LYOPHILIZED, FOR SOLUTION INTRAVENOUS EVERY 8 HOURS
Refills: 0 | Status: DISCONTINUED | OUTPATIENT
Start: 2023-01-01 | End: 2023-01-01

## 2023-01-01 RX ORDER — POTASSIUM PHOSPHATE, MONOBASIC POTASSIUM PHOSPHATE, DIBASIC 236; 224 MG/ML; MG/ML
30 INJECTION, SOLUTION INTRAVENOUS ONCE
Refills: 0 | Status: COMPLETED | OUTPATIENT
Start: 2023-01-01 | End: 2023-01-01

## 2023-01-01 RX ORDER — DEXTROSE 50 % IN WATER 50 %
15 SYRINGE (ML) INTRAVENOUS ONCE
Refills: 0 | Status: DISCONTINUED | OUTPATIENT
Start: 2023-01-01 | End: 2023-01-01

## 2023-01-01 RX ORDER — INSULIN LISPRO 100/ML
VIAL (ML) SUBCUTANEOUS EVERY 6 HOURS
Refills: 0 | Status: DISCONTINUED | OUTPATIENT
Start: 2023-01-01 | End: 2023-01-01

## 2023-01-01 RX ORDER — INSULIN GLARGINE 100 [IU]/ML
20 INJECTION, SOLUTION SUBCUTANEOUS ONCE
Refills: 0 | Status: COMPLETED | OUTPATIENT
Start: 2023-01-01 | End: 2023-01-01

## 2023-01-01 RX ORDER — PIPERACILLIN AND TAZOBACTAM 4; .5 G/20ML; G/20ML
3.38 INJECTION, POWDER, LYOPHILIZED, FOR SOLUTION INTRAVENOUS EVERY 12 HOURS
Refills: 0 | Status: DISCONTINUED | OUTPATIENT
Start: 2023-01-01 | End: 2023-01-01

## 2023-01-01 RX ORDER — MIDODRINE HYDROCHLORIDE 2.5 MG/1
10 TABLET ORAL EVERY 8 HOURS
Refills: 0 | Status: DISCONTINUED | OUTPATIENT
Start: 2023-01-01 | End: 2023-01-01

## 2023-01-01 RX ORDER — CHLORHEXIDINE GLUCONATE 213 G/1000ML
1 SOLUTION TOPICAL
Refills: 0 | Status: DISCONTINUED | OUTPATIENT
Start: 2023-01-01 | End: 2023-01-01

## 2023-01-01 RX ORDER — GLUCAGON INJECTION, SOLUTION 0.5 MG/.1ML
1 INJECTION, SOLUTION SUBCUTANEOUS ONCE
Refills: 0 | Status: DISCONTINUED | OUTPATIENT
Start: 2023-01-01 | End: 2023-01-01

## 2023-01-01 RX ORDER — MULTIVIT-MIN/FERROUS GLUCONATE 9 MG/15 ML
15 LIQUID (ML) ORAL DAILY
Refills: 0 | Status: DISCONTINUED | OUTPATIENT
Start: 2023-01-01 | End: 2023-01-01

## 2023-01-01 RX ADMIN — Medication 1 MILLIGRAM(S): at 12:26

## 2023-01-01 RX ADMIN — MIDODRINE HYDROCHLORIDE 10 MILLIGRAM(S): 2.5 TABLET ORAL at 21:56

## 2023-01-01 RX ADMIN — HEPARIN SODIUM 5000 UNIT(S): 5000 INJECTION INTRAVENOUS; SUBCUTANEOUS at 21:55

## 2023-01-01 RX ADMIN — Medication 7.03 MICROGRAM(S)/KG/MIN: at 16:53

## 2023-01-01 RX ADMIN — HEPARIN SODIUM 5000 UNIT(S): 5000 INJECTION INTRAVENOUS; SUBCUTANEOUS at 05:40

## 2023-01-01 RX ADMIN — Medication 100 MILLIEQUIVALENT(S): at 08:11

## 2023-01-01 RX ADMIN — Medication 100 MILLIEQUIVALENT(S): at 07:26

## 2023-01-01 RX ADMIN — Medication 2: at 01:39

## 2023-01-01 RX ADMIN — INSULIN HUMAN 8 UNIT(S)/HR: 100 INJECTION, SOLUTION SUBCUTANEOUS at 03:47

## 2023-01-01 RX ADMIN — HEPARIN SODIUM 5000 UNIT(S): 5000 INJECTION INTRAVENOUS; SUBCUTANEOUS at 23:14

## 2023-01-01 RX ADMIN — CHLORHEXIDINE GLUCONATE 15 MILLILITER(S): 213 SOLUTION TOPICAL at 05:39

## 2023-01-01 RX ADMIN — Medication 4: at 17:35

## 2023-01-01 RX ADMIN — CHLORHEXIDINE GLUCONATE 1 APPLICATION(S): 213 SOLUTION TOPICAL at 14:35

## 2023-01-01 RX ADMIN — Medication 1: at 23:52

## 2023-01-01 RX ADMIN — Medication 1 MILLIGRAM(S): at 12:45

## 2023-01-01 RX ADMIN — VASOPRESSIN 6 UNIT(S)/MIN: 20 INJECTION INTRAVENOUS at 00:59

## 2023-01-01 RX ADMIN — Medication 15 MILLILITER(S): at 15:14

## 2023-01-01 RX ADMIN — Medication 100 MILLIEQUIVALENT(S): at 05:56

## 2023-01-01 RX ADMIN — PIPERACILLIN AND TAZOBACTAM 25 GRAM(S): 4; .5 INJECTION, POWDER, LYOPHILIZED, FOR SOLUTION INTRAVENOUS at 20:40

## 2023-01-01 RX ADMIN — PIPERACILLIN AND TAZOBACTAM 25 GRAM(S): 4; .5 INJECTION, POWDER, LYOPHILIZED, FOR SOLUTION INTRAVENOUS at 06:16

## 2023-01-01 RX ADMIN — PIPERACILLIN AND TAZOBACTAM 200 GRAM(S): 4; .5 INJECTION, POWDER, LYOPHILIZED, FOR SOLUTION INTRAVENOUS at 16:54

## 2023-01-01 RX ADMIN — Medication 8: at 02:37

## 2023-01-01 RX ADMIN — PIPERACILLIN AND TAZOBACTAM 25 GRAM(S): 4; .5 INJECTION, POWDER, LYOPHILIZED, FOR SOLUTION INTRAVENOUS at 13:13

## 2023-01-01 RX ADMIN — Medication 100 MILLIGRAM(S): at 11:40

## 2023-01-01 RX ADMIN — Medication 8: at 23:36

## 2023-01-01 RX ADMIN — Medication 100 MILLIEQUIVALENT(S): at 09:50

## 2023-01-01 RX ADMIN — SODIUM CHLORIDE 1000 MILLILITER(S): 9 INJECTION, SOLUTION INTRAVENOUS at 17:01

## 2023-01-01 RX ADMIN — Medication 2: at 17:41

## 2023-01-01 RX ADMIN — Medication 200 MEQ/KG/HR: at 00:09

## 2023-01-01 RX ADMIN — PIPERACILLIN AND TAZOBACTAM 25 GRAM(S): 4; .5 INJECTION, POWDER, LYOPHILIZED, FOR SOLUTION INTRAVENOUS at 14:01

## 2023-01-01 RX ADMIN — Medication 100 MILLIEQUIVALENT(S): at 20:13

## 2023-01-01 RX ADMIN — CHLORHEXIDINE GLUCONATE 15 MILLILITER(S): 213 SOLUTION TOPICAL at 06:04

## 2023-01-01 RX ADMIN — CHLORHEXIDINE GLUCONATE 15 MILLILITER(S): 213 SOLUTION TOPICAL at 17:41

## 2023-01-01 RX ADMIN — Medication 2: at 12:25

## 2023-01-01 RX ADMIN — PIPERACILLIN AND TAZOBACTAM 25 GRAM(S): 4; .5 INJECTION, POWDER, LYOPHILIZED, FOR SOLUTION INTRAVENOUS at 01:40

## 2023-01-01 RX ADMIN — PANTOPRAZOLE SODIUM 40 MILLIGRAM(S): 20 TABLET, DELAYED RELEASE ORAL at 12:44

## 2023-01-01 RX ADMIN — Medication 1: at 06:13

## 2023-01-01 RX ADMIN — PIPERACILLIN AND TAZOBACTAM 25 GRAM(S): 4; .5 INJECTION, POWDER, LYOPHILIZED, FOR SOLUTION INTRAVENOUS at 14:23

## 2023-01-01 RX ADMIN — INSULIN GLARGINE 20 UNIT(S): 100 INJECTION, SOLUTION SUBCUTANEOUS at 22:11

## 2023-01-01 RX ADMIN — Medication 40 MILLIEQUIVALENT(S): at 23:52

## 2023-01-01 RX ADMIN — Medication 200 MEQ/KG/HR: at 05:37

## 2023-01-01 RX ADMIN — MIDODRINE HYDROCHLORIDE 10 MILLIGRAM(S): 2.5 TABLET ORAL at 05:26

## 2023-01-01 RX ADMIN — PANTOPRAZOLE SODIUM 40 MILLIGRAM(S): 20 TABLET, DELAYED RELEASE ORAL at 11:37

## 2023-01-01 RX ADMIN — PIPERACILLIN AND TAZOBACTAM 25 GRAM(S): 4; .5 INJECTION, POWDER, LYOPHILIZED, FOR SOLUTION INTRAVENOUS at 13:07

## 2023-01-01 RX ADMIN — POTASSIUM PHOSPHATE, MONOBASIC POTASSIUM PHOSPHATE, DIBASIC 62.5 MILLIMOLE(S): 236; 224 INJECTION, SOLUTION INTRAVENOUS at 09:39

## 2023-01-01 RX ADMIN — HEPARIN SODIUM 5000 UNIT(S): 5000 INJECTION INTRAVENOUS; SUBCUTANEOUS at 05:48

## 2023-01-01 RX ADMIN — Medication 1: at 13:07

## 2023-01-01 RX ADMIN — PANTOPRAZOLE SODIUM 40 MILLIGRAM(S): 20 TABLET, DELAYED RELEASE ORAL at 12:25

## 2023-01-01 RX ADMIN — Medication 40 MILLIEQUIVALENT(S): at 02:42

## 2023-01-01 RX ADMIN — CHLORHEXIDINE GLUCONATE 15 MILLILITER(S): 213 SOLUTION TOPICAL at 17:37

## 2023-01-01 RX ADMIN — Medication 15 MILLILITER(S): at 11:37

## 2023-01-01 RX ADMIN — Medication 25 GRAM(S): at 05:57

## 2023-01-01 RX ADMIN — CHLORHEXIDINE GLUCONATE 1 APPLICATION(S): 213 SOLUTION TOPICAL at 11:40

## 2023-01-01 RX ADMIN — PIPERACILLIN AND TAZOBACTAM 25 GRAM(S): 4; .5 INJECTION, POWDER, LYOPHILIZED, FOR SOLUTION INTRAVENOUS at 21:40

## 2023-01-01 RX ADMIN — Medication 200 MEQ/KG/HR: at 20:40

## 2023-01-01 RX ADMIN — INSULIN HUMAN 8 UNIT(S)/HR: 100 INJECTION, SOLUTION SUBCUTANEOUS at 05:38

## 2023-01-01 RX ADMIN — Medication 7.03 MICROGRAM(S)/KG/MIN: at 23:17

## 2023-01-01 RX ADMIN — Medication 7.03 MICROGRAM(S)/KG/MIN: at 03:19

## 2023-01-01 RX ADMIN — Medication 1 MILLIGRAM(S): at 11:53

## 2023-01-01 RX ADMIN — Medication 1: at 17:54

## 2023-01-01 RX ADMIN — HEPARIN SODIUM 5000 UNIT(S): 5000 INJECTION INTRAVENOUS; SUBCUTANEOUS at 13:34

## 2023-01-01 RX ADMIN — Medication 100 MILLIEQUIVALENT(S): at 13:50

## 2023-01-01 RX ADMIN — INSULIN GLARGINE 20 UNIT(S): 100 INJECTION, SOLUTION SUBCUTANEOUS at 22:34

## 2023-01-01 RX ADMIN — HEPARIN SODIUM 5000 UNIT(S): 5000 INJECTION INTRAVENOUS; SUBCUTANEOUS at 14:02

## 2023-01-01 RX ADMIN — CHLORHEXIDINE GLUCONATE 15 MILLILITER(S): 213 SOLUTION TOPICAL at 05:48

## 2023-01-01 RX ADMIN — Medication 1: at 03:09

## 2023-01-01 RX ADMIN — Medication 100 MILLIEQUIVALENT(S): at 10:11

## 2023-01-01 RX ADMIN — Medication 7.03 MICROGRAM(S)/KG/MIN: at 11:52

## 2023-01-01 RX ADMIN — Medication 25 GRAM(S): at 01:01

## 2023-01-01 RX ADMIN — INSULIN GLARGINE 20 UNIT(S): 100 INJECTION, SOLUTION SUBCUTANEOUS at 22:06

## 2023-01-01 RX ADMIN — MIDODRINE HYDROCHLORIDE 10 MILLIGRAM(S): 2.5 TABLET ORAL at 13:34

## 2023-01-01 RX ADMIN — Medication 50 MILLIEQUIVALENT(S): at 03:56

## 2023-01-01 RX ADMIN — Medication 15 MILLILITER(S): at 12:43

## 2023-01-01 RX ADMIN — CHLORHEXIDINE GLUCONATE 1 APPLICATION(S): 213 SOLUTION TOPICAL at 12:26

## 2023-01-01 RX ADMIN — Medication 100 MILLIGRAM(S): at 11:53

## 2023-01-01 RX ADMIN — POTASSIUM PHOSPHATE, MONOBASIC POTASSIUM PHOSPHATE, DIBASIC 83.33 MILLIMOLE(S): 236; 224 INJECTION, SOLUTION INTRAVENOUS at 03:14

## 2023-01-01 RX ADMIN — Medication 100 MILLIEQUIVALENT(S): at 06:30

## 2023-01-01 RX ADMIN — HEPARIN SODIUM 5000 UNIT(S): 5000 INJECTION INTRAVENOUS; SUBCUTANEOUS at 06:07

## 2023-01-01 RX ADMIN — HEPARIN SODIUM 5000 UNIT(S): 5000 INJECTION INTRAVENOUS; SUBCUTANEOUS at 21:59

## 2023-01-01 RX ADMIN — Medication 1 MILLIGRAM(S): at 11:40

## 2023-01-01 RX ADMIN — VASOPRESSIN 6 UNIT(S)/MIN: 20 INJECTION INTRAVENOUS at 01:40

## 2023-01-01 RX ADMIN — Medication 100 MILLIEQUIVALENT(S): at 15:19

## 2023-01-01 RX ADMIN — Medication 6: at 14:32

## 2023-01-01 RX ADMIN — CHLORHEXIDINE GLUCONATE 1 APPLICATION(S): 213 SOLUTION TOPICAL at 11:54

## 2023-01-01 RX ADMIN — HEPARIN SODIUM 5000 UNIT(S): 5000 INJECTION INTRAVENOUS; SUBCUTANEOUS at 05:26

## 2023-01-01 RX ADMIN — CHLORHEXIDINE GLUCONATE 15 MILLILITER(S): 213 SOLUTION TOPICAL at 17:44

## 2023-01-01 RX ADMIN — SODIUM CHLORIDE 1000 MILLILITER(S): 9 INJECTION INTRAMUSCULAR; INTRAVENOUS; SUBCUTANEOUS at 09:47

## 2023-01-01 RX ADMIN — CHLORHEXIDINE GLUCONATE 15 MILLILITER(S): 213 SOLUTION TOPICAL at 05:26

## 2023-01-01 RX ADMIN — Medication 100 MEQ/KG/HR: at 11:12

## 2023-01-01 RX ADMIN — Medication 100 MILLIEQUIVALENT(S): at 08:34

## 2023-01-01 RX ADMIN — Medication 7.03 MICROGRAM(S)/KG/MIN: at 09:47

## 2023-01-01 RX ADMIN — PANTOPRAZOLE SODIUM 40 MILLIGRAM(S): 20 TABLET, DELAYED RELEASE ORAL at 11:54

## 2023-01-01 RX ADMIN — VASOPRESSIN 6 UNIT(S)/MIN: 20 INJECTION INTRAVENOUS at 12:05

## 2023-01-01 RX ADMIN — INSULIN HUMAN 8 UNIT(S)/HR: 100 INJECTION, SOLUTION SUBCUTANEOUS at 08:10

## 2023-01-01 RX ADMIN — Medication 50 MILLIEQUIVALENT(S): at 02:26

## 2023-01-01 RX ADMIN — HEPARIN SODIUM 5000 UNIT(S): 5000 INJECTION INTRAVENOUS; SUBCUTANEOUS at 14:23

## 2023-01-01 RX ADMIN — MIDODRINE HYDROCHLORIDE 10 MILLIGRAM(S): 2.5 TABLET ORAL at 14:02

## 2023-01-01 RX ADMIN — Medication 1: at 06:46

## 2023-01-01 RX ADMIN — PIPERACILLIN AND TAZOBACTAM 25 GRAM(S): 4; .5 INJECTION, POWDER, LYOPHILIZED, FOR SOLUTION INTRAVENOUS at 05:40

## 2023-01-01 RX ADMIN — Medication 7.03 MICROGRAM(S)/KG/MIN: at 09:33

## 2023-01-01 RX ADMIN — Medication 15 MILLILITER(S): at 11:53

## 2023-01-01 RX ADMIN — MIDODRINE HYDROCHLORIDE 10 MILLIGRAM(S): 2.5 TABLET ORAL at 10:11

## 2023-01-01 RX ADMIN — Medication 100 MILLIGRAM(S): at 12:26

## 2023-01-01 RX ADMIN — CHLORHEXIDINE GLUCONATE 1 APPLICATION(S): 213 SOLUTION TOPICAL at 12:45

## 2023-01-01 RX ADMIN — HEPARIN SODIUM 5000 UNIT(S): 5000 INJECTION INTRAVENOUS; SUBCUTANEOUS at 13:02

## 2023-01-01 RX ADMIN — HEPARIN SODIUM 5000 UNIT(S): 5000 INJECTION INTRAVENOUS; SUBCUTANEOUS at 21:40

## 2023-01-01 RX ADMIN — CHLORHEXIDINE GLUCONATE 15 MILLILITER(S): 213 SOLUTION TOPICAL at 17:55

## 2023-01-01 RX ADMIN — Medication 50 MILLIEQUIVALENT(S): at 01:00

## 2023-01-01 RX ADMIN — Medication 100 MILLIGRAM(S): at 12:43

## 2023-01-01 RX ADMIN — PIPERACILLIN AND TAZOBACTAM 25 GRAM(S): 4; .5 INJECTION, POWDER, LYOPHILIZED, FOR SOLUTION INTRAVENOUS at 02:40

## 2023-01-13 NOTE — ED PROVIDER NOTE - CLINICAL SUMMARY MEDICAL DECISION MAKING FREE TEXT BOX
46 y/o M presents after ROSC where patient was found unresponsive by family, unknown downtime, ROSC obtained in the field - hypotensive and requiring pressors - CT head, EKG evolving from diffuse depressions to more narrow QRS with prolonged ROSC - drug and alcohol levels for evaluation of other reasons for cardiac arrest - will attempt to obtain further collateral from family.

## 2023-01-13 NOTE — ED PROVIDER NOTE - PROGRESS NOTE DETAILS
Sandy; Further collateral obtained from patient's wife and TIM with help of  Quang ([id number]). Wife Destinee Gifford [phone].    Wife notes normal behavior last night -  patient did complain of headache, but ate dinner and then went to another room to play video games. She recalls that she went to bed after 1 am after speaking with a friend on the phone in [place]. At that time the patient was still in the other room and she assumed he was still playing video games. She may have noticed some rapid breathing at that time, but she is unsure. This morning she got her school aged daughter to school and didn't realize he was still at home. She went back into the house to get food for herself and the cat kept pawing at her and bothering her - acting strangely until she payed attention to the cat who led her to the room the patient had been in last night. She opened the door and found him lying on the floor, pale, not breathing, not moving. She got her older son who called 911 and he started CPR until EMS arrived. She denies that he has any known medical problems, takes no medications. She denies that he drinks or uses drugs. She says 4-5 months ago he felt sick, saw a doctor and was told everything is okay, but he does not go to doctors regularly.

## 2023-01-13 NOTE — PROCEDURE NOTE - NSINDICATIONS_GEN_A_CORE
critical illness/emergency venous access/venous access
arterial puncture to obtain ABG's/critical patient/monitoring purposes

## 2023-01-13 NOTE — ED PROVIDER NOTE - OBJECTIVE STATEMENT
48 y/o M with PMH deafness, possible "heart attack" a few months ago presents via EMS after he was found unresponsive at home by family members. Patient was last interactive around midnight on social media, but was not seen by anyone at that time. Unknown HPI - patient was in cardiac arrest, CPR initiated and patient was intubated with 7.5 ETT at 28 at the lip, ROSC was obtained, temporarily lost pulses at one point with immediate ROSC after CPR was re-initiated. Patient got 5 epis in the field, no shocks. Per EMS, family was minimally able to communicate with them due to sign language.

## 2023-01-13 NOTE — ED PROCEDURE NOTE - CPROC ED POST RADIOGRAPHY1
L brachiocephalic vein/post-procedure radiography performed/no pneumothorax/central line located in the

## 2023-01-13 NOTE — PROCEDURE NOTE - ADDITIONAL PROCEDURE DETAILS
Procedure performed independent of critical care time
Procedure done independent of critical care time.

## 2023-01-13 NOTE — ED PROVIDER NOTE - CARE PLAN
Principal Discharge DX:	Cardiac arrest   1 Principal Discharge DX:	Cardiac arrest  Secondary Diagnosis:	Anoxic brain injury

## 2023-01-13 NOTE — H&P ADULT - NSHPSOCIALHISTORY_GEN_ALL_CORE
Denies smoking history, family reports alcohol use ~1x weekly  Employed in a store stocking inventory  Non-smoker  Family denies other illicit drug use

## 2023-01-13 NOTE — ED PROVIDER NOTE - EKG ADDITIONAL QUESTION - PERFORMED INDEPENDENT VISUALIZATION
Yes Complex Repair Preamble Text (Leave Blank If You Do Not Want): Extensive wide undermining was performed.

## 2023-01-13 NOTE — ED ADULT NURSE NOTE - NSIMPLEMENTINTERV_GEN_ALL_ED
Implemented All Fall with Harm Risk Interventions:  [place] to call system. Call bell, personal items and telephone within reach. Instruct patient to call for assistance. Room bathroom lighting operational. Non-slip footwear when patient is off stretcher. Physically safe environment: no spills, clutter or unnecessary equipment. Stretcher in lowest position, wheels locked, appropriate side rails in place. Provide visual cue, wrist band, yellow gown, etc. Monitor gait and stability. Monitor for mental status changes and reorient to person, place, and time. Review medications for side effects contributing to fall risk. Reinforce activity limits and safety measures with patient and family. Provide visual clues: red socks.

## 2023-01-13 NOTE — ED PROVIDER NOTE - ST/T WAVE
diffuse ST depressions, ST elevation in aVR improving diffuse ST depressions, resolved elevations improving ST depressions diffusely, no elevations

## 2023-01-13 NOTE — H&P ADULT - ASSESSMENT
46 yo male with PMH deaf, who presents via EMS after he was found unresponsive at home by family members. Patient was last seen by his wife last night playing video games with his son ~[phone], active on social media around midnight, but was not seen by anyone at that time. Patient's wife stated to ER staff via  that he did not come to bed last night however he frequently sleeps in another room, therefore this was not out of the ordinary. This morning, she assumed he already went to work but the cat alerted her that something was not right. Patient was found unresponsive in spare bedroom, CPR initiated by one of their sons. Upon EMS arrival, CPR was continued, initial rhythm reportedly asystole. He was intubated in the field with #7.5 ETT. ROSC was obtained but lost pulse, and ROSC regained quickly after CPR was re-initiated. Patient got 5 epi in the field, no shocks, total downtime estimated at 26 minutes. Patient admitted to ICU for  46 yo male with PMH deaf, who presented s/p cardiac arrest in the setting of acute alcohol intoxication ? underlying DIOGO with acute hypoxic hypercapnic respiratory failure with distributive shock, acute hypoxic-ischemic encephalopathy concerning for anoxic brain injury, YINA, transaminitis. 48 yo male with PMH deaf, who presented s/p cardiac arrest in the setting of acute alcohol intoxication ? underlying DIOGO with acute hypoxic hypercapnic respiratory failure with distributive shock, acute hypoxic-ischemic encephalopathy concerning for anoxic brain injury, YINA, transaminitis.    Neuro exam very poor, no brainstem reflexes on initial exam, though still with profound acidosis on repeat ABG   CT brain with diffuse anoxic changes and cerebral edema, may likely be brain dead or herniate  On no sedation, continue off neurosuppressants  High vasopressor requirements, actively titrating Norepinephrine to targeted MAP goal 65-70. Added Vasopressin gtt.  Monitoring end points of perfusion, trending lactate  Repeat ABG reviewed, Tv increased to compensate.  Increase sodium bicarbonate infusion to 150cc/hr  Give 1L balanced crystalloid stat  Obtain formal TTE to eval LVEF   cvEEG monitoring to r/o NCSE    Prognosis very poor. Goals of care discussed at length with patient's ex-wife, her , son Des, and brother-in-law. For now to remain full code. Family informed he may be brain dead or progress to cardiac arrest again at any time.  Palliative Care c/s placed.      CRITICAL CARE TIME SPENT: 105 minutes assessing presenting problems of acute critical illness, which pose high probability of life threatening deterioration or end organ damage/dysfunction, requiring frequent bedside reassessments and adjustments to plan of care, including medical decision making, initiating plan of care, reviewing data, reviewing radiologic exams, discussing with multidisciplinary team, discussing goals of care. Critical Care time is non-inclusive of independent time spent on procedures performed.

## 2023-01-13 NOTE — H&P ADULT - NSHPPHYSICALEXAM_GEN_ALL_CORE
Neurovascular Exam:  -pupils dilated, non-reactive to light  -corneal reflex, gag reflex not present   -GCS of 3    Respiratory:  -intubated AC/VC    Cardiovascular:    -POCUS showed no hypokinesis or wall motion abnormaltiies

## 2023-01-13 NOTE — PATIENT PROFILE ADULT - FALL HARM RISK - RISK INTERVENTIONS

## 2023-01-13 NOTE — H&P ADULT - HISTORY OF PRESENT ILLNESS
48 yo male with PMH deaf, who presents via EMS after he was found unresponsive at home by family members. Patient was last seen by his wife last night playing video games with his son ~0411-1504, active on social media around midnight, but was not seen by anyone at that time. Patient's wife stated to ER staff via  that he did not come to bed last night however he frequently sleeps in another room, therefore this was not out of the ordinary. This morning, she assumed he already went to work but the cat alerted her that something was not right. Patient was found unresponsive in spare bedroom, CPR initiated by one of their sons. Upon EMS arrival, CPR was continued, initial rhythm reportedly asystole. He was intubated in the field with #7.5 ETT. ROSC was obtained but lost pulse, and ROSC regained quickly after CPR was re-initiated. Patient got 5 epi in the field, no shocks, total downtime estimated at 26 minutes. 48 yo male with PMH deaf, who presents via EMS after he was found unresponsive at home by family members. Patient was last seen by his wife last night playing video games with his son ~[id number], active on social media around midnight, but was not seen by anyone at that time. Patient's wife stated to ER staff via  that he did not come to bed last night however he frequently sleeps in another room, therefore this was not out of the ordinary. This morning, she assumed he already went to work but the cat alerted her that something was not right. Patient was found unresponsive in spare bedroom, CPR initiated by one of their sons. Upon EMS arrival, CPR was continued, initial rhythm reportedly asystole. He was intubated in the field with #7.5 ETT. ROSC was obtained but lost pulse, and ROSC regained quickly after CPR was re-initiated. Patient got 5 epi in the field, no shocks, total downtime estimated at 26 minutes. Labs remarkable for YINA Cr 2.0, mild transaminitis, lactate >16.5, pH <6.95. Alcohol level 286. UTox negative. 46 yo male with PMH deaf, who presents via EMS after he was found unresponsive at home by family members. Patient was last seen by his wife last night playing video games with his son ~[phone], active on social media around midnight, but was not seen by anyone at that time. Patient's wife stated to ER staff via  that he did not come to bed last night however he frequently sleeps in another room, therefore this was not out of the ordinary. This morning, she assumed he already went to work but the cat alerted her that something was not right. Patient was found unresponsive in spare bedroom, CPR initiated by one of their sons. Upon EMS arrival, CPR was continued, initial rhythm reportedly asystole. He was intubated in the field with #7.5 ETT. ROSC was obtained but lost pulse, and ROSC regained quickly after CPR was re-initiated. Patient got 5 epi in the field, no shocks, total downtime estimated at 26 minutes. Labs remarkable for YINA Cr 2.0, mild transaminitis, lactate >16.5, pH <6.95. Alcohol level 286. UTox negative. CT brain with diffuse anoxic brain injury and cerebral edema. POCUS with grossly preserved EF, no obvious wall motion abnormalities.    Patient is deaf, no other reported PMHx. Per ex- wife, he may have been here about 6 months ago for a heart problem, but is unaware. Upon asking other family members, they deny cardiac history. Per son, patient does snore with periods of apnea and sounds like he is choking while he sleeps. They have urged him to seek medical evaluation, but he refused.  No known family medical history.    48 yo male with PMH deaf, who presents via EMS after he was found unresponsive at home by family members. Patient was last seen by his wife last night playing video games with his son ~[phone], active on social media around midnight, but was not seen by anyone at that time. Patient's wife stated to ER staff via  that he did not come to bed last night however he frequently sleeps in another room, therefore this was not out of the ordinary. This morning, she assumed he already went to work but the cat alerted her that something was not right. Patient was found unresponsive in spare bedroom, CPR initiated by one of their sons. Upon EMS arrival, CPR was continued, initial rhythm reportedly asystole. He was intubated in the field with #7.5 ETT. ROSC was obtained but lost pulse, and ROSC regained quickly after CPR was re-initiated. Patient got 5 epi in the field, no shocks, total downtime estimated at 26 minutes. Labs remarkable for YINA Cr 2.0, mild transaminitis, lactate >16.5, pH <6.95. Alcohol level 286. UTox negative. CT brain with diffuse anoxic brain injury and cerebral edema. POCUS with grossly preserved EF, no obvious wall motion abnormalities.    Patient is deaf, no other reported PMHx. Per ex- wife, he may have been here about 6 months ago for a heart problem, but is unaware. Upon asking other family members, they deny cardiac history. Per son, patient does snore with periods of apnea and sounds like he is choking while he sleeps. They have urged him to seek medical evaluation, but he refused.  No known family medical history.     Encounter interpreted by  #[id number] Alva

## 2023-01-13 NOTE — H&P ADULT - NEUROLOGICAL
Unresponsive, pupils fixed and dilated. Corneal reflexes absent. No cough reflex. No gag reflex. No withdrawal to noxious stimuli x 4 extremities. When placed on CPAP, no spontaneous respirations initiated.

## 2023-01-13 NOTE — H&P ADULT - NS PANP COMMENT GEN_ALL_CORE FT
47-year-old morbidly obese deaf male with unknown other past medical history found unconscious at home being admitted to medical ICU after sustaining out of hospital prolonged cardiac arrest (PEA minimum more than 25 minutes), severe cardiogenic shock, acute hypoxic hypercapnic respiratory failure, acute kidney injury, transaminitis, anoxic brain injury/cerebral edema with encephalopathy most likely secondary to alcohol intoxication with underlying undiagnosed obesity hypoventilation syndrome/obstructive sleep apnea related  hypoxic/ hypercapnic respiratory failureexacerbation     admit to medical ICU   norepinephrine, vasopressin infusion to keep MAP more than 65   empiric antibiotics, follow blood culture   mechanical ventilator: Volume assist control adjusted to increase minute ventilation to compensate for metabolic acidosis  Bicarbonate infusion, indwelling urinary catheter close I's and O's  Continuous EEG  IV thiamine folic acid multivitamin  We will keep normothermic towards 36 degrees  Extremely grim prognosis  No sedation for now   goals of care with patient's ex-wife, son with help of  as well as brother at bedside  Full code for now

## 2023-01-13 NOTE — PATIENT PROFILE ADULT - NSPROGENSOURCEINFO_GEN_A_NUR
unable to respond I have reviewed and confirmed nurses' notes for patient's medications, allergies, medical history, and surgical history.

## 2023-01-13 NOTE — ED PROVIDER NOTE - CONSIDERATION OF ADMISSION OBSERVATION
Consideration of Admission/Observation Patient intubated, on pressors, no mental status, initial EKG concerning for diffuse ischemia, likely in the setting or ROSC - EKG improved and resolved with prolonged ROSC, however patient with no mental status.

## 2023-01-13 NOTE — ED ADULT TRIAGE NOTE - CHIEF COMPLAINT QUOTE
Pt arrives intubated with Emeka device in place on standby after ROSC obtained after 26 minutes of CPR. Pt was found by family with unknown downtime. EMS administered 5 epi. Pt moved to  ER and team at bedside upon pts arrival.

## 2023-01-13 NOTE — ED PROVIDER NOTE - PHYSICAL EXAMINATION
Constitutional: Patient unresponsive, no purposeful movements.  HENT: NC/AT. Intubated with 7.5 ETT.  Eyes: Pupils fixed and dilated.  Neck: Trachea midline.  Cardiac: RRR, no murmurs, 1+ central and peripheral pulses. Poor EF on bedside cardiac sono.  Resp: BVM respirations, intubated with ETT. No spontaneous respirations.  Abd: soft, non-distended.  Extremities: Warm and well perfused.  Skin: No diaphoresis, no outward sings of trauma.  Neuro: Patient unresponsive, no spontaneous movements. Does not withdraw to pain. No gag reflex. No corneal reflex.

## 2023-01-13 NOTE — ED ADULT NURSE NOTE - OBJECTIVE STATEMENT
Pt arrives s/p cardiac arrest.  As per EMS, pt who is deaf at baseline found down by family at home unresponsive.  Unknown downtime; pt last seen active on facebook 7-8 hours ago.  CPR initiated at 812.  Rosc achieved by ems at 838; pt lost pulse and 850 rosc achieved for second time.  Pt received 5 epi en route to [facility] and was intubated pta with 7.5 tube.  Pt arrives to ED SB on CM and hypotensive.  Respirations assisted via BVM.  As per EMS, pt had MI 4 months ago. Pt arrives s/p cardiac arrest.  As per EMS, pt who is deaf at baseline found down by family at home unresponsive.  Unknown downtime; pt last seen active on facebook 7-8 hours ago.  CPR initiated at 812.  Rosc achieved by ems at 838; pt lost pulse and 850 rosc achieved for second time.  Pt received 5 epi en route to [facility] and was intubated pta with 7.5 tube.  Pt arrives to ED SB on CM and hypotensive.  Respirations assisted via BVM.

## 2023-01-14 NOTE — EEG REPORT - NS EEG TEXT BOX
[id number]     Study Start Date:  1900 1/13/23  Study End Date:  0800	1/14/23  Duration x Hours:  13 hr   --------------------------------------------------------------------------------------------------    History:  CC/ HPI Patient is a 47y old  Male who presents with a chief complaint of   MEDICATIONS  (STANDING):  chlorhexidine 0.12% Liquid 15 milliLiter(s) Oral Mucosa every 12 hours  chlorhexidine 2% Cloths 1 Application(s) Topical daily  dextrose 5%. 1000 milliLiter(s) (50 mL/Hr) IV Continuous <Continuous>  dextrose 5%. 1000 milliLiter(s) (100 mL/Hr) IV Continuous <Continuous>  dextrose 50% Injectable 25 Gram(s) IV Push once  dextrose 50% Injectable 12.5 Gram(s) IV Push once  dextrose 50% Injectable 25 Gram(s) IV Push once  folic acid 1 milliGRAM(s) Oral daily  glucagon  Injectable 1 milliGRAM(s) IntraMuscular once  heparin   Injectable 5000 Unit(s) SubCutaneous every 8 hours  insulin lispro (ADMELOG) corrective regimen sliding scale   SubCutaneous every 4 hours  insulin regular Infusion 8 Unit(s)/Hr (8 mL/Hr) IV Continuous <Continuous>  multivitamin/minerals/iron Oral Solution (CENTRUM) 15 milliLiter(s) Oral daily  norepinephrine Infusion 0.05 MICROgram(s)/kG/Min (7.03 mL/Hr) IV Continuous <Continuous>  pantoprazole  Injectable 40 milliGRAM(s) IV Push daily  piperacillin/tazobactam IVPB.. 3.375 Gram(s) IV Intermittent every 8 hours  sodium bicarbonate  Infusion 0.4 mEq/kG/Hr (200 mL/Hr) IV Continuous <Continuous>  thiamine 100 milliGRAM(s) Oral daily  vasopressin Infusion 0.04 Unit(s)/Min (6 mL/Hr) IV Continuous <Continuous>      --------------------------------------------------------------------------------------------------  Study Interpretation:    [[[Abbreviation Key:  PDR=alpha rhythm/posterior dominant rhythm. A-P=anterior posterior.  Amplitude: ‘very low’:<20; ‘low’:20-49; ‘medium’:; ‘high’:>150uV.  Persistence for periodic/rhythmic patterns (% of epoch) ‘rare’:<1%; ‘occasional’:1-10%; ‘frequent’:10-50%; ‘abundant’:50-90%; ‘continuous’:>90%.  Persistence for sporadic discharges: ‘rare’:<1/hr; ‘occasional’:1/min-1/hr; ‘frequent’:>1/min; ‘abundant’:>1/10 sec.  RPP=rhythmic and periodic patterns; GRDA=generalized rhythmic delta activity; FIRDA=frontal intermittent GRDA; LRDA=lateralized rhythmic delta activity; TIRDA=temporal intermittent rhythmic delta activity;  LPD=PLED=lateralized periodic discharges; GPD=generalized periodic discharges; BIPDs =bilateral independent periodic discharges; Mf=multifocal; SIRPDs=stimulus induced rhythmic, periodic, or ictal appearing discharges; BIRDs=brief potentially ictal rhythmic discharges >4 Hz, lasting .5-10s; PFA (paroxysmal bursts >13 Hz or =8 Hz <10s).  Modifiers: +F=with fast component; +S=with spike component; +R=with rhythmic component.  S-B=burst suppression pattern.  Max=maximal. N1-drowsy; N2-stage II sleep; N3-slow wave sleep. SSS/BETS=small sharp spikes/benign epileptiform transients of sleep. HV=hyperventilation; PS=photic stimulation]]]    Daily EEG Visual Analysis    FINDINGS:      Background:  Continuous: Diffusely suppressed   Symmetry: symmetric  PDR: no pdr  Reactivity: unreactive  Voltage: suppressed < 10 uV  Anterior Posterior Gradient: absent  Other background findings: none  Breach: absent      State Changes:   Absent    Sporadic Epileptiform Discharges:    None    Rhythmic and Periodic Patterns (RPPs):  None     Electrographic and Electroclinical seizures:  None    Other Clinical Events:  None    Activation Procedures:   Hyperventilation was not performed.    Photic stimulation was not performed.    Artifacts:  Intermittent myogenic and movement artifacts were noted.    ECG:  The heart rate on single channel ECG was predominantly between  BPM.    EEG Classification / Summary:    Abnormal  EEG in a comatose patient     Severe diffuse slowing, diffusely suppressed        Clinical Impression:    Severe diffuse cerebral dysfunction, non-reactive diffusely suppressed.   There were no epileptiform abnormalities recorded.          This is a preliminary report     Shanel Lenz M.D.   Epilepsy fellow    -------------------------------------------------------------------------------------------------------  [facility] EEG Reading Room Ph#: [phone]  Epilepsy Answering Service after 5PM and before 8:30AM: Ph#: [phone]     [id number]     Study Start Date:  1900 1/13/23  Study End Date:  0800	1/14/23  Duration x Hours:  13 hr   --------------------------------------------------------------------------------------------------    History:  CC/ HPI Patient is a 47y old  Male who presents with a chief complaint of   MEDICATIONS  (STANDING):  chlorhexidine 0.12% Liquid 15 milliLiter(s) Oral Mucosa every 12 hours  chlorhexidine 2% Cloths 1 Application(s) Topical daily  dextrose 5%. 1000 milliLiter(s) (50 mL/Hr) IV Continuous <Continuous>  dextrose 5%. 1000 milliLiter(s) (100 mL/Hr) IV Continuous <Continuous>  dextrose 50% Injectable 25 Gram(s) IV Push once  dextrose 50% Injectable 12.5 Gram(s) IV Push once  dextrose 50% Injectable 25 Gram(s) IV Push once  folic acid 1 milliGRAM(s) Oral daily  glucagon  Injectable 1 milliGRAM(s) IntraMuscular once  heparin   Injectable 5000 Unit(s) SubCutaneous every 8 hours  insulin lispro (ADMELOG) corrective regimen sliding scale   SubCutaneous every 4 hours  insulin regular Infusion 8 Unit(s)/Hr (8 mL/Hr) IV Continuous <Continuous>  multivitamin/minerals/iron Oral Solution (CENTRUM) 15 milliLiter(s) Oral daily  norepinephrine Infusion 0.05 MICROgram(s)/kG/Min (7.03 mL/Hr) IV Continuous <Continuous>  pantoprazole  Injectable 40 milliGRAM(s) IV Push daily  piperacillin/tazobactam IVPB.. 3.375 Gram(s) IV Intermittent every 8 hours  sodium bicarbonate  Infusion 0.4 mEq/kG/Hr (200 mL/Hr) IV Continuous <Continuous>  thiamine 100 milliGRAM(s) Oral daily  vasopressin Infusion 0.04 Unit(s)/Min (6 mL/Hr) IV Continuous <Continuous>      --------------------------------------------------------------------------------------------------  Study Interpretation:    [[[Abbreviation Key:  PDR=alpha rhythm/posterior dominant rhythm. A-P=anterior posterior.  Amplitude: ‘very low’:<20; ‘low’:20-49; ‘medium’:; ‘high’:>150uV.  Persistence for periodic/rhythmic patterns (% of epoch) ‘rare’:<1%; ‘occasional’:1-10%; ‘frequent’:10-50%; ‘abundant’:50-90%; ‘continuous’:>90%.  Persistence for sporadic discharges: ‘rare’:<1/hr; ‘occasional’:1/min-1/hr; ‘frequent’:>1/min; ‘abundant’:>1/10 sec.  RPP=rhythmic and periodic patterns; GRDA=generalized rhythmic delta activity; FIRDA=frontal intermittent GRDA; LRDA=lateralized rhythmic delta activity; TIRDA=temporal intermittent rhythmic delta activity;  LPD=PLED=lateralized periodic discharges; GPD=generalized periodic discharges; BIPDs =bilateral independent periodic discharges; Mf=multifocal; SIRPDs=stimulus induced rhythmic, periodic, or ictal appearing discharges; BIRDs=brief potentially ictal rhythmic discharges >4 Hz, lasting .5-10s; PFA (paroxysmal bursts >13 Hz or =8 Hz <10s).  Modifiers: +F=with fast component; +S=with spike component; +R=with rhythmic component.  S-B=burst suppression pattern.  Max=maximal. N1-drowsy; N2-stage II sleep; N3-slow wave sleep. SSS/BETS=small sharp spikes/benign epileptiform transients of sleep. HV=hyperventilation; PS=photic stimulation]]]    Daily EEG Visual Analysis    FINDINGS:      Background:  Continuous: Diffusely suppressed   Symmetry: symmetric  PDR: no pdr  Reactivity: unreactive  Voltage: suppressed < 10 uV  Anterior Posterior Gradient: absent  Other background findings: none  Breach: absent      State Changes:   Absent    Sporadic Epileptiform Discharges:    None    Rhythmic and Periodic Patterns (RPPs):  None     Electrographic and Electroclinical seizures:  None    Other Clinical Events:  None    Activation Procedures:   Hyperventilation was not performed.    Photic stimulation was not performed.    Artifacts:  Intermittent myogenic and movement artifacts were noted.    ECG:  The heart rate on single channel ECG was predominantly between  BPM.    EEG Classification / Summary:    Abnormal  EEG in a comatose patient     Severe diffuse slowing, diffusely suppressed        Clinical Impression:    Severe diffuse cerebral dysfunction, non-reactive diffusely suppressed.   There were no epileptiform abnormalities recorded.            Shanel Lenz M.D.   Epilepsy fellow    -------------------------------------------------------------------------------------------------------  [facility] EEG Reading Room Ph#: [phone]  Epilepsy Answering Service after 5PM and before 8:30AM: Ph#: [phone]     [facility][id number]     Study Start Date:  1900 1/13/23  Study End Date:  1738	1/14/23  Duration x Hours:  22 hr 38 min    --------------------------------------------------------------------------------------------------    History:  CC/ HPI Patient is a 47y old  Male who presents with a chief complaint of   MEDICATIONS  (STANDING):  chlorhexidine 0.12% Liquid 15 milliLiter(s) Oral Mucosa every 12 hours  chlorhexidine 2% Cloths 1 Application(s) Topical daily  dextrose 5%. 1000 milliLiter(s) (50 mL/Hr) IV Continuous <Continuous>  dextrose 5%. 1000 milliLiter(s) (100 mL/Hr) IV Continuous <Continuous>  dextrose 50% Injectable 25 Gram(s) IV Push once  dextrose 50% Injectable 12.5 Gram(s) IV Push once  dextrose 50% Injectable 25 Gram(s) IV Push once  folic acid 1 milliGRAM(s) Oral daily  glucagon  Injectable 1 milliGRAM(s) IntraMuscular once  heparin   Injectable 5000 Unit(s) SubCutaneous every 8 hours  insulin lispro (ADMELOG) corrective regimen sliding scale   SubCutaneous every 4 hours  insulin regular Infusion 8 Unit(s)/Hr (8 mL/Hr) IV Continuous <Continuous>  multivitamin/minerals/iron Oral Solution (CENTRUM) 15 milliLiter(s) Oral daily  norepinephrine Infusion 0.05 MICROgram(s)/kG/Min (7.03 mL/Hr) IV Continuous <Continuous>  pantoprazole  Injectable 40 milliGRAM(s) IV Push daily  piperacillin/tazobactam IVPB.. 3.375 Gram(s) IV Intermittent every 8 hours  sodium bicarbonate  Infusion 0.4 mEq/kG/Hr (200 mL/Hr) IV Continuous <Continuous>  thiamine 100 milliGRAM(s) Oral daily  vasopressin Infusion 0.04 Unit(s)/Min (6 mL/Hr) IV Continuous <Continuous>      --------------------------------------------------------------------------------------------------  Study Interpretation:    [[[Abbreviation Key:  PDR=alpha rhythm/posterior dominant rhythm. A-P=anterior posterior.  Amplitude: ‘very low’:<20; ‘low’:20-49; ‘medium’:; ‘high’:>150uV.  Persistence for periodic/rhythmic patterns (% of epoch) ‘rare’:<1%; ‘occasional’:1-10%; ‘frequent’:10-50%; ‘abundant’:50-90%; ‘continuous’:>90%.  Persistence for sporadic discharges: ‘rare’:<1/hr; ‘occasional’:1/min-1/hr; ‘frequent’:>1/min; ‘abundant’:>1/10 sec.  RPP=rhythmic and periodic patterns; GRDA=generalized rhythmic delta activity; FIRDA=frontal intermittent GRDA; LRDA=lateralized rhythmic delta activity; TIRDA=temporal intermittent rhythmic delta activity;  LPD=PLED=lateralized periodic discharges; GPD=generalized periodic discharges; BIPDs =bilateral independent periodic discharges; Mf=multifocal; SIRPDs=stimulus induced rhythmic, periodic, or ictal appearing discharges; BIRDs=brief potentially ictal rhythmic discharges >4 Hz, lasting .5-10s; PFA (paroxysmal bursts >13 Hz or =8 Hz <10s).  Modifiers: +F=with fast component; +S=with spike component; +R=with rhythmic component.  S-B=burst suppression pattern.  Max=maximal. N1-drowsy; N2-stage II sleep; N3-slow wave sleep. SSS/BETS=small sharp spikes/benign epileptiform transients of sleep. HV=hyperventilation; PS=photic stimulation]]]    Daily EEG Visual Analysis    FINDINGS:      Background:  Continuous: Diffusely suppressed   Symmetry: symmetric  PDR: no pdr  Reactivity: unreactive  Voltage: suppressed < 10 uV  Anterior Posterior Gradient: absent  Other background findings: none  Breach: absent      State Changes:   Absent    Sporadic Epileptiform Discharges:    None    Rhythmic and Periodic Patterns (RPPs):  None     Electrographic and Electroclinical seizures:  None    Other Clinical Events:  None    Activation Procedures:   Hyperventilation was not performed.    Photic stimulation was not performed.    Artifacts:  Intermittent myogenic and movement artifacts were noted.    ECG:  The heart rate on single channel ECG was predominantly between  BPM.    EEG Classification / Summary:    Abnormal  EEG in a comatose patient     Severe diffuse slowing, diffusely suppressed        Clinical Impression:    Severe diffuse cerebral dysfunction, non-reactive diffusely suppressed.   There were no epileptiform abnormalities recorded.            Shanel Lenz M.D.   Epilepsy fellow    -------------------------------------------------------------------------------------------------------  [facility] EEG Reading Room Ph#: [phone]  Epilepsy Answering Service after 5PM and before 8:30AM: Ph#: [phone]

## 2023-01-14 NOTE — PROGRESS NOTE ADULT - ASSESSMENT
47M PMH deaf who presented on 1/13/23 after being found unresponsive at home, found to be in asystolic cardiac arrest, intubated in the field, total downtime approx 26 minutes but unclear length of antecedent downtime, CTH with diffuse anoxic brain injury    Impression/Plan:    Asystolic cardiac arrest  26 min downtime however unclear antecedent downtime  Diffuse anoxic brain injury  - Monitor mentation off sedation  - EEG  - UTox negative  - Ongoing GOC    Shock state - likely distributive  Elevated lactic acid  - Empiric Zosyn  - Levo/Vaso, goal MAP >65  - F/u official TTE  - Trend lactate    Acute renal failure  Transaminemia  Hypokalemia, hypomagnesemia, hypophosphatemia  - In setting of hypoperfusion 2/2 cardiac arrest, shock liver  - Replete lytes PRN  - Monitor CMP  - Renally dose all medications and avoid nephrotoxic medications    Metabolic acidosis  - On bicarb infusion  - Monitor UOP  - Trend renal function    F/E/N/PPx/Lines  - S/p 1L LR and 1L NS; will maintain euvolemia  - Replete lytes PRN - caution given acute renal failure  - HSQ Q8H; Protonix 40mg daily  - PIV; L axillary A-line (1/13- ); R IJ CVC (1/13- )    Ethics/Dispo  - Full code  - C/w care in ICU  - Prognosis is dismal      Kameron Franklin M.D.  , Pulmonary & Critical Care Medicine  [facility]  Pulmonary and Sleep Medicine at [place]  [address]  [place] [address]  T: [phone]  F: [phone]

## 2023-01-14 NOTE — PROGRESS NOTE ADULT - SUBJECTIVE AND OBJECTIVE BOX
Patient is a 47y old  Male who presents with a chief complaint of     BRIEF HOSPITAL COURSE:   47M PMH deaf who presented on 23 after being found unresponsive at home, found to be in asystolic cardiac arrest, intubated in the field, total downtime approx 26 minutes but unclear length of antecedent downtime. EtOH level was 286 and UTox was negative. CTH showed diffuse anoxic brain injury and cerebral edema.       PAST MEDICAL & SURGICAL HISTORY:  Deaf      Deaf      No significant past surgical history            Medications:  piperacillin/tazobactam IVPB.. 3.375 Gram(s) IV Intermittent every 8 hours    norepinephrine Infusion 0.05 MICROgram(s)/kG/Min IV Continuous <Continuous>          heparin   Injectable 5000 Unit(s) SubCutaneous every 8 hours    pantoprazole  Injectable 40 milliGRAM(s) IV Push daily      dextrose 50% Injectable 25 Gram(s) IV Push once  dextrose 50% Injectable 12.5 Gram(s) IV Push once  dextrose 50% Injectable 25 Gram(s) IV Push once  dextrose Oral Gel 15 Gram(s) Oral once PRN  glucagon  Injectable 1 milliGRAM(s) IntraMuscular once  insulin lispro (ADMELOG) corrective regimen sliding scale   SubCutaneous every 4 hours  vasopressin Infusion 0.04 Unit(s)/Min IV Continuous <Continuous>    dextrose 5%. 1000 milliLiter(s) IV Continuous <Continuous>  dextrose 5%. 1000 milliLiter(s) IV Continuous <Continuous>  potassium chloride  20 mEq/100 mL IVPB 20 milliEquivalent(s) IV Intermittent every 2 hours  potassium phosphate IVPB 30 milliMole(s) IV Intermittent once  sodium bicarbonate  Infusion 0.4 mEq/kG/Hr IV Continuous <Continuous>      chlorhexidine 0.12% Liquid 15 milliLiter(s) Oral Mucosa every 12 hours  chlorhexidine 2% Cloths 1 Application(s) Topical daily        Mode: AC/ CMV (Assist Control/ Continuous Mandatory Ventilation)  RR (machine): 24  TV (machine): 500  FiO2: 70  PEEP: 5  ITime: 1  MAP: 11  PIP: 23      ICU Vital Signs Last 24 Hrs  T(C): 33.7 (2023 00:15), Max: 37 (2023 16:00)  T(F): 92.7 (2023 00:15), Max: 98.6 (2023 16:00)  HR: 93 (2023 00:15) (59 - 123)  BP: 134/83 (2023 00:15) (61/30 - 171/144)  BP(mean): 97 (2023 00:15) (55 - 105)  ABP: --  ABP(mean): --  RR: 24 (2023 18:00) (15 - 24)  SpO2: 95% (2023 00:15) (58% - 100%)    O2 Parameters below as of 2023 12:10  Patient On (Oxygen Delivery Method): ventilator            ABG - ( 2023 23:32 )  pH, Arterial: 7.210 pH, Blood: x     /  pCO2: 40    /  pO2: 76    / HCO3: 16    / Base Excess: -11.9 /  SaO2: 95.7                I&O's Detail    2023 07:01  -  2023 01:56  --------------------------------------------------------  IN:    IV PiggyBack: 100 mL    Lactated Ringers Bolus: 1000 mL    Norepinephrine: 627 mL    Sodium Bicarbonate: 300 mL    Sodium Bicarbonate: 450 mL    Vasopressin: 36 mL  Total IN: 2513 mL    OUT:    Indwelling Catheter - Urethral (mL): 1500 mL  Total OUT: 1500 mL    Total NET: 1013 mL            LABS:                        15.9   20.91 )-----------( 164      ( 2023 23:25 )             45.0         144  |  106  |  27.2<H>  ----------------------------<  356<H>  2.6<LL>   |  16.0<L>  |  2.37<H>    Ca    7.4<L>      2023 23:25  Phos  0.6       Mg     1.5         TPro  5.4<L>  /  Alb  3.2<L>  /  TBili  0.9  /  DBili  x   /  AST  1416<H>  /  ALT  1129<H>  /  AlkPhos  86        CARDIAC MARKERS ( 2023 13:20 )  x     / 0.20 ng/mL / 1188 U/L / x     / 48.1 ng/mL  CARDIAC MARKERS ( 2023 09:23 )  x     / 0.11 ng/mL / x     / x     / x          CAPILLARY BLOOD GLUCOSE      POCT Blood Glucose.: 318 mg/dL (2023 23:25)    PT/INR - ( 2023 09:23 )   PT: 13.0 sec;   INR: 1.12 ratio         PTT - ( 2023 09:23 )  PTT:53.3 sec  Urinalysis Basic - ( 2023 09:50 )    Color: Yellow / Appearance: Clear / S.015 / pH: x  Gluc: x / Ketone: Negative  / Bili: Negative / Urobili: Negative mg/dL   Blood: x / Protein: Negative / Nitrite: Negative   Leuk Esterase: Negative / RBC: Negative /HPF / WBC Negative /HPF   Sq Epi: x / Non Sq Epi: Negative / Bacteria: Negative      CULTURES:      Physical Examination:  GENERAL: Intubated  HEENT: NC/AT  NECK: Supple  PULM: Coarse mechanical breath sounds B/L  CVS: +S1, S2  ABD: Soft  EXTREMITIES: No pedal edema B/L  SKIN: No open wounds  NEURO: No purposeful extremity movements    DEVICES:     RADIOLOGY:  < from: CT Head No Cont (23 @ 10:47) >    ACC: [id number] EXAM:  CT BRAIN                          PROCEDURE DATE:  2023          INTERPRETATION:  CLINICAL INDICATIONS:  unresponsive    COMPARISON: Head CT dated 10/30/2020    TECHNIQUE: Noncontrast CT of the head. Multiplanar reformations are   submitted.    FINDINGS:    Diffuse sulcal effacement suggesting diffuse cerebral edema and global   hypoxic ischemic injury  No acute intracranial hemorrhage.  Right-sided approach NG tube is noted. Chronic right orbit medial wall   fracturedeformity.  The orbits, mastoid air cells and visualized paranasal sinuses are   unremarkable. The calvarium is intact. Consider MRI as clinically   warranted.    IMPRESSION: Diffuse cerebral edema/global hypoxic ischemic injury.    --- End of Report---            HANG SAUCEDO MD; Attending Radiologist  This document has been electronically signed. 2023 10:54AM    < end of copied text >    ~~~~~~~~~~~~~~~~~~~~~~~~~~~~~~~~~~~~~~~~~~~~~~~~~~~~~~~~~~~~~~      < from: CT Chest No Cont (23 @ 10:48) >    ACC: [id number] EXAM:  CT ABDOMEN AND PELVIS                        ACC: [id number] EXAM:  CT CHEST                          PROCEDURE DATE:  2023          INTERPRETATION:  CLINICAL INFORMATION: Cardiac arrest, altered mental   status: Clear history    COMPARISON: 3/14/2009.    CONTRAST/COMPLICATIONS:  IV Contrast: NONE  Oral Contrast: NONE  Complications: None reported at time of study completion    PROCEDURE:  CT of the Chest, Abdomen and Pelvis was performed.  Sagittal and coronal reformats were performed.    FINDINGS:  CHEST:  LUNGS AND LARGE AIRWAYS: Endotracheal tube with the tip above the noy.   There is a 4 mm pleural-based nodule in the right middle lobe, similar in   appearance to prior. Dependent subsegmental atelectasis bilaterally.  PLEURA: No pleural effusion.  VESSELS: Left internal jugular line with the tip in the left   brachiocephalic vein.  HEART: Heart size is normal. No pericardial effusion.  MEDIASTINUM AND DIRK: No lymphadenopathy.  CHEST WALL AND LOWER NECK:Within normal limits.    ABDOMEN AND PELVIS: Evaluation is limited by streak artifact and lack of   intravenous contrast.  LIVER: Steatosis.  BILE DUCTS: Normal caliber.  GALLBLADDER: Within normal limits.  SPLEEN: Slightly atrophic.  PANCREAS: Within normal limits.  ADRENALS: Within normal limits.  KIDNEYS/URETERS: Within normal limits.    BLADDER: Miranda catheter.  REPRODUCTIVE ORGANS: Prostate within normal limits.    BOWEL: Enteric tube with the tip in the stomach. Stomach is mildly   distended. Several fluid-filled mildly prominent bowel loops in the   pelvis, nonspecific. No bowel obstruction. Appendix is normal.  PERITONEUM: No ascites.  VESSELS: Atherosclerotic changes.  RETROPERITONEUM/LYMPH NODES: Several subcentimeter retroperitoneal nodes,   nonspecific.  ABDOMINAL WALL: Tiny fat-containing umbilical hernia.  BONES: Bilateral nondisplaced fractures of the anterior third through   sixth ribs. Degenerative changes of the spine.      IMPRESSION:  Endotracheal tube in place with bilateral dependent subsegmental   atelectasis.    Bilateral nondisplaced fractures of the anterior third through sixth ribs.    Stomach is mildly distended and there are multiple fluid-filled prominent   small bowel loops in the pelvis, nonspecific.    Fatty infiltration of the liver.    Additional findings as above.      --- End of Report ---            ROSIE ADAN MD; Attending Radiologist  This document has been electronically signed. Jaxson 13 2023 11:06AM    < end of copied text >    ~~~~~~~~~~~~~~~~~~~~~~~~~~~~~~~~~~~~~~~~~~~~~~~~~~~~~~~~~~~~~~

## 2023-01-15 NOTE — DIETITIAN INITIAL EVALUATION ADULT - PERTINENT MEDS FT
MEDICATIONS  (STANDING):  chlorhexidine 0.12% Liquid 15 milliLiter(s) Oral Mucosa every 12 hours  chlorhexidine 2% Cloths 1 Application(s) Topical daily  dextrose 5%. 1000 milliLiter(s) (50 mL/Hr) IV Continuous <Continuous>  dextrose 5%. 1000 milliLiter(s) (100 mL/Hr) IV Continuous <Continuous>  dextrose 50% Injectable 25 Gram(s) IV Push once  dextrose 50% Injectable 12.5 Gram(s) IV Push once  dextrose 50% Injectable 25 Gram(s) IV Push once  folic acid 1 milliGRAM(s) Oral daily  glucagon  Injectable 1 milliGRAM(s) IntraMuscular once  heparin   Injectable 5000 Unit(s) SubCutaneous every 8 hours  insulin glargine Injectable (LANTUS) 20 Unit(s) SubCutaneous at bedtime  insulin lispro (ADMELOG) corrective regimen sliding scale   SubCutaneous every 6 hours  multivitamin/minerals/iron Oral Solution (CENTRUM) 15 milliLiter(s) Oral daily  pantoprazole  Injectable 40 milliGRAM(s) IV Push daily  piperacillin/tazobactam IVPB.. 3.375 Gram(s) IV Intermittent every 8 hours  thiamine 100 milliGRAM(s) Oral daily  vasopressin Infusion 0.04 Unit(s)/Min (6 mL/Hr) IV Continuous <Continuous>    MEDICATIONS  (PRN):  dextrose Oral Gel 15 Gram(s) Oral once PRN Blood Glucose LESS THAN 70 milliGRAM(s)/deciliter

## 2023-01-15 NOTE — PROGRESS NOTE ADULT - SUBJECTIVE AND OBJECTIVE BOX
Patient is a 47y old  Male who presents with a chief complaint of cardiac arrest.    BRIEF HOSPITAL COURSE: 48yo deaf male ASHLEY found unresponsive and asystolic at home, intubated in field by EMS with ROSC obtained wihtin estimated downtime of 26 minutes. Labs notable for ETOH 286 on arrival, cardiac arrest suspected to be iso possible aspiration event while intoxicated. CTH with diffuse anoxic injury and cerebral edema. Course complicated by dual pressor shock, YINA, transaminitis, metabolic acidosis, and hyperglycemia.    Events last 24 hours: Remains off sedation, no improvement in mentation. EEG with severe diffuse cerebral dysfunction, non-reactive diffusely. Weaned off levophed during day shift. Titrated off Vasopressin at midnight. Bridged off insulin gtt to 20U lantus and ISS for further glycemic control.    PAST MEDICAL & SURGICAL HISTORY:  Deaf      Deaf      No significant past surgical history          Review of Systems: Intubated, unable to obtain      Medications:  piperacillin/tazobactam IVPB.. 3.375 Gram(s) IV Intermittent every 8 hours            heparin   Injectable 5000 Unit(s) SubCutaneous every 8 hours    pantoprazole  Injectable 40 milliGRAM(s) IV Push daily      dextrose 50% Injectable 25 Gram(s) IV Push once  dextrose 50% Injectable 12.5 Gram(s) IV Push once  dextrose 50% Injectable 25 Gram(s) IV Push once  dextrose Oral Gel 15 Gram(s) Oral once PRN  glucagon  Injectable 1 milliGRAM(s) IntraMuscular once  insulin glargine Injectable (LANTUS) 20 Unit(s) SubCutaneous at bedtime  insulin lispro (ADMELOG) corrective regimen sliding scale   SubCutaneous every 6 hours  vasopressin Infusion 0.04 Unit(s)/Min IV Continuous <Continuous>    dextrose 5%. 1000 milliLiter(s) IV Continuous <Continuous>  dextrose 5%. 1000 milliLiter(s) IV Continuous <Continuous>  folic acid 1 milliGRAM(s) Oral daily  multivitamin/minerals/iron Oral Solution (CENTRUM) 15 milliLiter(s) Oral daily  sodium bicarbonate  Infusion 0.4 mEq/kG/Hr IV Continuous <Continuous>  thiamine 100 milliGRAM(s) Oral daily      chlorhexidine 0.12% Liquid 15 milliLiter(s) Oral Mucosa every 12 hours  chlorhexidine 2% Cloths 1 Application(s) Topical daily        Mode: AC/ CMV (Assist Control/ Continuous Mandatory Ventilation)  RR (machine): 24  TV (machine): 500  FiO2: 70  PEEP: 5  MAP: 12  PIP: 26      ICU Vital Signs Last 24 Hrs  T(C): 37 (2023 23:00), Max: 37.6 (2023 13:30)  T(F): 98.6 (2023 23:00), Max: 99.7 (2023 13:30)  HR: 116 (15 Jaxson 2023 01:02) (98 - 124)  BP: 128/85 (2023 19:15) (98/52 - 147/74)  BP(mean): 99 (2023 19:15) (66 - 108)  ABP: 141/85 (2023 19:45) (63/46 - 148/91)  ABP(mean): 104 (2023 19:45) (53 - 111)  RR: 24 (2023 19:45) (21 - 38)  SpO2: 95% (15 Jaxson 2023 01:02) (93% - 97%)    O2 Parameters below as of 2023 04:00  Patient On (Oxygen Delivery Method): ventilator    O2 Concentration (%): 70        ABG - ( 2023 08:30 )  pH, Arterial: 7.350 pH, Blood: x     /  pCO2: 47    /  pO2: 70    / HCO3: 26    / Base Excess: 0.3   /  SaO2: 95.9                I&O's Detail    2023 07:01  -  2023 07:00  --------------------------------------------------------  IN:    Insulin: 30 mL    IV PiggyBack: 350 mL    IV PiggyBack: 499.9 mL    Lactated Ringers Bolus: 1000 mL    Norepinephrine: 1434.2 mL    Sodium Bicarbonate: 2200 mL    Sodium Bicarbonate: 300 mL    Sodium Bicarbonate: 600 mL    Vasopressin: 108 mL  Total IN: 6522.1 mL    OUT:    Gastrostomy Tube (mL): 450 mL    Indwelling Catheter - Urethral (mL): 2040 mL  Total OUT: 2490 mL    Total NET: 4032.1 mL      2023 07:01  -  15 Jaxson 2023 01:43  --------------------------------------------------------  IN:    Insulin: 115 mL    IV PiggyBack: 300 mL    IV PiggyBack: 350 mL    Norepinephrine: 140.2 mL    Sodium Bicarbonate: 2400 mL    Vasopressin: 72 mL  Total IN: 3377.2 mL    OUT:    Indwelling Catheter - Urethral (mL): 680 mL  Total OUT: 680 mL    Total NET: 2697.2 mL            LABS:                        15.3   20.52 )-----------( 137      ( 2023 06:19 )             42.9         145  |  99  |  46.2<H>  ----------------------------<  155<H>  3.8   |  31.0<H>  |  3.66<H>    Ca    6.4<LL>      2023 22:00  Phos  3.0       Mg     1.8         TPro  4.8<L>  /  Alb  2.6<L>  /  TBili  1.7  /  DBili  x   /  AST  1015<H>  /  ALT  973<H>  /  AlkPhos  69  14      CARDIAC MARKERS ( 2023 13:20 )  x     / 0.20 ng/mL / 1188 U/L / x     / 48.1 ng/mL  CARDIAC MARKERS ( 2023 09:23 )  x     / 0.11 ng/mL / x     / x     / x          CAPILLARY BLOOD GLUCOSE      POCT Blood Glucose.: 138 mg/dL (2023 22:00)    PT/INR - ( 2023 09:23 )   PT: 13.0 sec;   INR: 1.12 ratio         PTT - ( 2023 09:23 )  PTT:53.3 sec  Urinalysis Basic - ( 2023 09:50 )    Color: Yellow / Appearance: Clear / S.015 / pH: x  Gluc: x / Ketone: Negative  / Bili: Negative / Urobili: Negative mg/dL   Blood: x / Protein: Negative / Nitrite: Negative   Leuk Esterase: Negative / RBC: Negative /HPF / WBC Negative /HPF   Sq Epi: x / Non Sq Epi: Negative / Bacteria: Negative      CULTURES:  Culture Results:   No growth (23 @ 09:50)      Physical Examination:    General: No acute distress. Intubated.    HEENT/NEURO: Pupils fixed and dilated. Negative corneal, cough, or gag reflexes. Negative purposeful movements. No withdrawal to pain in all extremities.    PULM: Breathing comfortably on vent, good BS B/L with no Rales or Rhonchi, no significant sputum production    CVS: Regular rate and rhythm, no murmurs, rubs, or gallops    ABD: BS+ Soft, nondistended, nontender, no masses    EXT: No edema, nontender    SKIN: Warm and well perfused, no rashes noted.      CRITICAL CARE TIME SPENT: *** mins of time have been spent evaluating, reviewing all available lab and radiologic material, reviewing the EMR and treating this critically ill patient, who has complex medical problems and life threatening organ dysfunction. This also included speaking with the staff, consultants, and family.     Patient is a 47y old  Male who presents with a chief complaint of cardiac arrest.    BRIEF HOSPITAL COURSE: 48yo deaf male ASHLEY found unresponsive and asystolic at home, intubated in field by EMS with ROSC obtained wihtin estimated downtime of 26 minutes. Labs notable for ETOH 286 on arrival, cardiac arrest suspected to be iso possible aspiration event while intoxicated. CTH with diffuse anoxic injury and cerebral edema. Course complicated by dual pressor shock, YINA, transaminitis, metabolic acidosis, and hyperglycemia.    Events last 24 hours: Remains off sedation, no improvement in mentation. EEG with severe diffuse cerebral dysfunction, non-reactive diffusely. Weaned off levophed during day shift. Titrated off Vasopressin at midnight. Bridged off insulin gtt to 20U lantus and ISS for further glycemic control.    PAST MEDICAL & SURGICAL HISTORY:  Deaf      Deaf      No significant past surgical history          Review of Systems: Intubated, unable to obtain      Medications:  piperacillin/tazobactam IVPB.. 3.375 Gram(s) IV Intermittent every 8 hours            heparin   Injectable 5000 Unit(s) SubCutaneous every 8 hours    pantoprazole  Injectable 40 milliGRAM(s) IV Push daily      dextrose 50% Injectable 25 Gram(s) IV Push once  dextrose 50% Injectable 12.5 Gram(s) IV Push once  dextrose 50% Injectable 25 Gram(s) IV Push once  dextrose Oral Gel 15 Gram(s) Oral once PRN  glucagon  Injectable 1 milliGRAM(s) IntraMuscular once  insulin glargine Injectable (LANTUS) 20 Unit(s) SubCutaneous at bedtime  insulin lispro (ADMELOG) corrective regimen sliding scale   SubCutaneous every 6 hours  vasopressin Infusion 0.04 Unit(s)/Min IV Continuous <Continuous>    dextrose 5%. 1000 milliLiter(s) IV Continuous <Continuous>  dextrose 5%. 1000 milliLiter(s) IV Continuous <Continuous>  folic acid 1 milliGRAM(s) Oral daily  multivitamin/minerals/iron Oral Solution (CENTRUM) 15 milliLiter(s) Oral daily  sodium bicarbonate  Infusion 0.4 mEq/kG/Hr IV Continuous <Continuous>  thiamine 100 milliGRAM(s) Oral daily      chlorhexidine 0.12% Liquid 15 milliLiter(s) Oral Mucosa every 12 hours  chlorhexidine 2% Cloths 1 Application(s) Topical daily        Mode: AC/ CMV (Assist Control/ Continuous Mandatory Ventilation)  RR (machine): 24  TV (machine): 500  FiO2: 70  PEEP: 5  MAP: 12  PIP: 26      ICU Vital Signs Last 24 Hrs  T(C): 37 (2023 23:00), Max: 37.6 (2023 13:30)  T(F): 98.6 (2023 23:00), Max: 99.7 (2023 13:30)  HR: 116 (15 Jaxson 2023 01:02) (98 - 124)  BP: 128/85 (2023 19:15) (98/52 - 147/74)  BP(mean): 99 (2023 19:15) (66 - 108)  ABP: 141/85 (2023 19:45) (63/46 - 148/91)  ABP(mean): 104 (2023 19:45) (53 - 111)  RR: 24 (2023 19:45) (21 - 38)  SpO2: 95% (15 Jaxson 2023 01:02) (93% - 97%)    O2 Parameters below as of 2023 04:00  Patient On (Oxygen Delivery Method): ventilator    O2 Concentration (%): 70        ABG - ( 2023 08:30 )  pH, Arterial: 7.350 pH, Blood: x     /  pCO2: 47    /  pO2: 70    / HCO3: 26    / Base Excess: 0.3   /  SaO2: 95.9                I&O's Detail    2023 07:01  -  2023 07:00  --------------------------------------------------------  IN:    Insulin: 30 mL    IV PiggyBack: 350 mL    IV PiggyBack: 499.9 mL    Lactated Ringers Bolus: 1000 mL    Norepinephrine: 1434.2 mL    Sodium Bicarbonate: 2200 mL    Sodium Bicarbonate: 300 mL    Sodium Bicarbonate: 600 mL    Vasopressin: 108 mL  Total IN: 6522.1 mL    OUT:    Gastrostomy Tube (mL): 450 mL    Indwelling Catheter - Urethral (mL): 2040 mL  Total OUT: 2490 mL    Total NET: 4032.1 mL      2023 07:01  -  15 Jaxson 2023 01:43  --------------------------------------------------------  IN:    Insulin: 115 mL    IV PiggyBack: 300 mL    IV PiggyBack: 350 mL    Norepinephrine: 140.2 mL    Sodium Bicarbonate: 2400 mL    Vasopressin: 72 mL  Total IN: 3377.2 mL    OUT:    Indwelling Catheter - Urethral (mL): 680 mL  Total OUT: 680 mL    Total NET: 2697.2 mL            LABS:                        15.3   20.52 )-----------( 137      ( 2023 06:19 )             42.9         145  |  99  |  46.2<H>  ----------------------------<  155<H>  3.8   |  31.0<H>  |  3.66<H>    Ca    6.4<LL>      2023 22:00  Phos  3.0       Mg     1.8         TPro  4.8<L>  /  Alb  2.6<L>  /  TBili  1.7  /  DBili  x   /  AST  1015<H>  /  ALT  973<H>  /  AlkPhos  69  14      CARDIAC MARKERS ( 2023 13:20 )  x     / 0.20 ng/mL / 1188 U/L / x     / 48.1 ng/mL  CARDIAC MARKERS ( 2023 09:23 )  x     / 0.11 ng/mL / x     / x     / x          CAPILLARY BLOOD GLUCOSE      POCT Blood Glucose.: 138 mg/dL (2023 22:00)    PT/INR - ( 2023 09:23 )   PT: 13.0 sec;   INR: 1.12 ratio         PTT - ( 2023 09:23 )  PTT:53.3 sec  Urinalysis Basic - ( 2023 09:50 )    Color: Yellow / Appearance: Clear / S.015 / pH: x  Gluc: x / Ketone: Negative  / Bili: Negative / Urobili: Negative mg/dL   Blood: x / Protein: Negative / Nitrite: Negative   Leuk Esterase: Negative / RBC: Negative /HPF / WBC Negative /HPF   Sq Epi: x / Non Sq Epi: Negative / Bacteria: Negative      CULTURES:  Culture Results:   No growth (23 @ 09:50)      Physical Examination:    General: No acute distress. Intubated.    HEENT/NEURO: Pupils fixed and dilated. Negative corneal, cough, or gag reflexes. Negative purposeful movements. No withdrawal to pain in all extremities.    PULM: Breathing comfortably on vent, good BS B/L with no Rales or Rhonchi, no significant sputum production    CVS: Regular rate and rhythm, no murmurs, rubs, or gallops    ABD: BS+ Soft, nondistended, nontender, no masses    EXT: No edema, nontender    SKIN: Warm and well perfused, no rashes noted.      CRITICAL CARE TIME SPENT: 45 mins of time have been spent evaluating, reviewing all available lab and radiologic material, reviewing the EMR and treating this critically ill patient, who has complex medical problems and life threatening organ dysfunction. This also included speaking with the staff, consultants, and family.

## 2023-01-15 NOTE — PROGRESS NOTE ADULT - ASSESSMENT
46yo deaf male BIBEMS found unresponsive and asystolic at home, intubated in field by EMS with ROSC obtained within estimated downtime of 26 minutes. Labs notable for ETOH 286 on arrival, cardiac arrest suspected to be iso possible aspiration event while intoxicated. CTH with diffuse anoxic injury and cerebral edema. Course complicated by dual pressor shock, YINA, transaminitis, metabolic acidosis, and hyperglycemia.    Cardiac Arrest (Aystolic)  Anoxic Encephalopathy  Shock, likely distributive - resolved  YINA  Transaminitis  Metabolic Acidosis  Hyperglycemia    Plan:  NEURO: Remains off sedation, no improvement in mental status. No brainstem function noted on exam. CTH and EEG consistent with anoxia. Will require official brain death testing today.  CARDIAC: Weaned off levophed and vasopressin, continuing to monitor end points of perfusion. Goal MAP >65.  RESPIRATORY: Currently on full vent support, titrating settings to maintain SaO2 >92%.    GI: NPO. IV PPI daily. LFTs improving, transaminitis attributable to shock on arrival.  :   ENDO: Bridged off insulin gtt to lantus 20U and ISS. FS q6h. Goal BG <180.  ID:   HEME: SQH for DVT ppx.    Dispo: Grim prognosis, continue ICU level care and GOC discussions with family. 46yo deaf male BIBEMS found unresponsive and asystolic at home, intubated in field by EMS with ROSC obtained within estimated downtime of 26 minutes. Labs notable for ETOH 286 on arrival, cardiac arrest suspected to be iso possible aspiration event while intoxicated. CTH with diffuse anoxic injury and cerebral edema. Course complicated by dual pressor shock, YINA, transaminitis, metabolic acidosis, and hyperglycemia.    Cardiac Arrest (Aystolic)  Anoxic Encephalopathy  Shock, likely distributive - resolved  YINA  Transaminitis  Metabolic Acidosis  Hyperglycemia    Plan:  NEURO: Remains off sedation, no improvement in mental status. Negative brainstem function noted on physical exam. CTH and EEG consistent with anoxia. Will require official brain death testing today. Continue thiamine and folate.  CARDIAC: Weaned off levophed and vasopressin, continuing to monitor end points of perfusion. Goal MAP >65.  RESPIRATORY: Currently on full vent support, actively titrating settings to maintain SaO2 >92%.  GI: NPO. IV PPI daily. LFTs improving, transaminitis attributable to shock on arrival.  : YINA likely iso ATN. Metabolic acidosis improving on bicarb gtt. Continue to trend Cr and monitor I&Os. UOP meeting goal >0.5cc/kg/hr.  ENDO: Bridged off insulin gtt to lantus 20U and ISS. FS q6h. Goal BG <180.  ID: Remains afebrile, WBC 20k, lactate downtrended to 3.5. Continue empiric Zosyn, f/u pending cxs. Narrow when able.  HEME: SQH for DVT ppx.    Dispo: Grim prognosis, continue ICU level care and GOC discussions with family.

## 2023-01-15 NOTE — DIETITIAN INITIAL EVALUATION ADULT - OTHER INFO
Pt is 47 year old deaf male BIBEMS found unresponsive and asystolic at home, intubated in field by EMS with ROSC obtained with estimated downtime of 26 minutes. Labs notable for ETOH 286 on arrival, cardiac arrest suspected to be iso possible aspiration event while intoxicated. CTH with diffuse anoxic injury and cerebral edema. Course complicated by dual pressor shock, YINA, transaminitis, metabolic acidosis, and hyperglycemia.

## 2023-01-15 NOTE — DIETITIAN INITIAL EVALUATION ADULT - PERTINENT LABORATORY DATA
01-15    147<H>  |  97  |  50.6<H>  ----------------------------<  195<H>  3.6   |  33.0<H>  |  4.25<H>    Ca    6.2<LL>      15 Jaxson 2023 04:22  Phos  5.1     01-15  Mg     1.5     01-15    TPro  4.8<L>  /  Alb  2.6<L>  /  TBili  2.2<H>  /  DBili  x   /  AST  1139<H>  /  ALT  1277<H>  /  AlkPhos  69  01-15  POCT Blood Glucose.: 184 mg/dL (01-15-23 @ 06:44)  A1C with Estimated Average Glucose Result: 5.8 % (01-14-23 @ 06:19)

## 2023-01-15 NOTE — PHARMACOTHERAPY INTERVENTION NOTE - COMMENTS
Post-Cardiac Arrest
Recommended to adjust piperacillin/tazobactam dose from 3.375 g IV q8h to 3.375 g IV q12h since the eGFR is 16 mL/min/1.73 m2.    Trace Leach, PharmD, [facility]  Clinical Pharmacy Specialist, Infectious Diseases  Tele-Antimicrobial Stewardship Program (Tele-ASP)  Tele-ASP Phone: [phone]

## 2023-01-16 NOTE — CONSULT NOTE ADULT - CONVERSATION DETAILS
Discussed overall medical condition, prognosis, and options for plan of care with patients wife, brother, and son, with Dr. Gay with help of family brought in language sign . I joined the conversation late, but Dr. Gay was in the midst of explaining poor prognosis in relation to cardiac arrest, poor mental status, shock state, ventilator dependence. I then addressed that given prior cardiac arrest and requiring a lot of support, he is at risk for another cardiac arrest at any time despite our efforts and if this occurs, it is unlikely to be therapeutic and may result in further suffering without hope of medical benefit. Wife is very faithful and needs more time to process and pray for miracle. they are not ready to make a decision about this right now but will think about it and process. Emotional support provided.

## 2023-01-16 NOTE — PROGRESS NOTE ADULT - SUBJECTIVE AND OBJECTIVE BOX
CC:  Patient is a 47y old  Male who presents with a chief complaint of Cardiac arrest     (15 Jaxson 2023 08:15)      HPI/BRIEF HOSPITAL COURSE: ***    Events last 24 hours: ***    PAST MEDICAL & SURGICAL HISTORY:  Deaf      Deaf      No significant past surgical history        Allergies    fish (Rash)  No Known Drug Allergies    Intolerances      FAMILY HISTORY:  No significant family history        Review of Systems:  CONSTITUTIONAL: No fever, chills, or fatigue  EYES: No eye pain, visual disturbances, or discharge  ENMT:  No difficulty hearing, tinnitus, vertigo; No sinus or throat pain  NECK: No pain or stiffness  RESPIRATORY: No cough, wheezing, chills or hemoptysis; No shortness of breath  CARDIOVASCULAR: No chest pain, palpitations, dizziness, or leg swelling  GASTROINTESTINAL: No abdominal or epigastric pain. No nausea, vomiting, or hematemesis; No diarrhea or constipation. No melena or hematochezia.  GENITOURINARY: No dysuria, frequency, hematuria, or incontinence  NEUROLOGICAL: No headaches, memory loss, loss of strength, numbness, or tremors  SKIN: No itching, burning, rashes, or lesions   MUSCULOSKELETAL: No joint pain or swelling; No muscle, back, or extremity pain  PSYCHIATRIC: No depression, anxiety, mood swings, or difficulty sleeping      Medications:  piperacillin/tazobactam IVPB.. 3.375 Gram(s) IV Intermittent every 12 hours    midodrine 10 milliGRAM(s) Oral every 8 hours          heparin   Injectable 5000 Unit(s) SubCutaneous every 8 hours    pantoprazole  Injectable 40 milliGRAM(s) IV Push daily      dextrose 50% Injectable 25 Gram(s) IV Push once  dextrose 50% Injectable 12.5 Gram(s) IV Push once  dextrose 50% Injectable 25 Gram(s) IV Push once  dextrose Oral Gel 15 Gram(s) Oral once PRN  glucagon  Injectable 1 milliGRAM(s) IntraMuscular once  insulin glargine Injectable (LANTUS) 20 Unit(s) SubCutaneous at bedtime  insulin lispro (ADMELOG) corrective regimen sliding scale   SubCutaneous every 6 hours  vasopressin Infusion 0.04 Unit(s)/Min IV Continuous <Continuous>    dextrose 5%. 1000 milliLiter(s) IV Continuous <Continuous>  dextrose 5%. 1000 milliLiter(s) IV Continuous <Continuous>  folic acid 1 milliGRAM(s) Oral daily  multivitamin/minerals/iron Oral Solution (CENTRUM) 15 milliLiter(s) Oral daily  thiamine 100 milliGRAM(s) Oral daily      chlorhexidine 0.12% Liquid 15 milliLiter(s) Oral Mucosa every 12 hours  chlorhexidine 2% Cloths 1 Application(s) Topical daily        Mode: AC/ CMV (Assist Control/ Continuous Mandatory Ventilation)  RR (machine): 24  TV (machine): 500  FiO2: 60  PEEP: 5  ITime: 1  MAP: 11  PIP: 21      ICU Vital Signs Last 24 Hrs  T(C): 37.3 (16 Jan 2023 08:00), Max: 37.7 (15 Jaxson 2023 16:30)  T(F): 99.1 (16 Jan 2023 08:00), Max: 99.9 (15 Jaxson 2023 16:30)  HR: 103 (16 Jan 2023 08:07) (103 - 117)  BP: 135/78 (16 Jan 2023 08:00) (129/72 - 138/82)  BP(mean): 94 (16 Jan 2023 08:00) (88 - 98)  ABP: 124/73 (16 Jan 2023 08:00) (82/25 - 137/83)  ABP(mean): 93 (16 Jan 2023 08:00) (54 - 103)  RR: 38 (16 Jan 2023 08:00) (21 - 51)  SpO2: 94% (16 Jan 2023 08:07) (92% - 95%)    O2 Parameters below as of 16 Jan 2023 08:00  Patient On (Oxygen Delivery Method): ventilator    O2 Concentration (%): 60      Vital Signs Last 24 Hrs  T(C): 37.3 (16 Jan 2023 08:00), Max: 37.7 (15 Jaxson 2023 16:30)  T(F): 99.1 (16 Jan 2023 08:00), Max: 99.9 (15 Jaxson 2023 16:30)  HR: 103 (16 Jan 2023 08:07) (103 - 117)  BP: 135/78 (16 Jan 2023 08:00) (129/72 - 138/82)  BP(mean): 94 (16 Jan 2023 08:00) (88 - 98)  RR: 38 (16 Jan 2023 08:00) (21 - 51)  SpO2: 94% (16 Jan 2023 08:07) (92% - 95%)    Parameters below as of 16 Jan 2023 08:00  Patient On (Oxygen Delivery Method): ventilator    O2 Concentration (%): 60    ABG - ( 16 Jan 2023 08:20 )  pH, Arterial: 7.580 pH, Blood: x     /  pCO2: 40    /  pO2: 63    / HCO3: 38    / Base Excess: 15.6  /  SaO2: 93.0                I&O's Detail    15 Jaxson 2023 07:01  -  16 Jan 2023 07:00  --------------------------------------------------------  IN:    IV PiggyBack: 50 mL    IV PiggyBack: 100 mL    IV PiggyBack: 225 mL    Vasopressin: 138 mL  Total IN: 513 mL    OUT:    Indwelling Catheter - Urethral (mL): 2525 mL  Total OUT: 2525 mL    Total NET: -2012 mL      16 Jan 2023 07:01  -  16 Jan 2023 09:10  --------------------------------------------------------  IN:    Vasopressin: 12 mL  Total IN: 12 mL    OUT:    Indwelling Catheter - Urethral (mL): 240 mL  Total OUT: 240 mL    Total NET: -228 mL            LABS:                        12.9   13.18 )-----------( 61       ( 16 Jan 2023 04:41 )             36.8     01-16    144  |  95<L>  |  63.6<H>  ----------------------------<  227<H>  3.0<L>   |  33.0<H>  |  5.03<H>    Ca    6.6<L>      16 Jan 2023 04:41  Phos  4.8     01-16  Mg     1.8     01-16    TPro  5.1<L>  /  Alb  2.8<L>  /  TBili  2.9<H>  /  DBili  x   /  AST  529<H>  /  ALT  932<H>  /  AlkPhos  98  01-16          CAPILLARY BLOOD GLUCOSE      POCT Blood Glucose.: 189 mg/dL (16 Jan 2023 06:12)        CULTURES:  Culture Results:   No growth to date. (01-13 @ 19:10)  Culture Results:   No growth to date. (01-13 @ 19:00)  Culture Results:   No growth (01-13 @ 09:50)    piperacillin/tazobactam IVPB.. 3.375 Gram(s) IV Intermittent every 12 hours      Physical Examination:  General: No acute distress.  Alert, oriented, interactive, nonfocal  NEURO: A&O X3, motor function 5/5 BL UE/LE  HEENT: Pupils equal, reactive to light.  Symmetric.  PULM: CTA BL, no significant sputum production, no wheezes, rales, rhonchi  CVS: Regular rate and rhythm, no murmurs, rubs, or gallops  ABD: Soft, nondistended, nontender, normoactive bowel sounds, no masses  EXT: No edema, nontender  SKIN: Warm and well perfused, no rashes noted      RADIOLOGY: ***                 CC:  Patient is a 47y old  Male who presents with a chief complaint of Cardiac arrest     (15 Jaxson 2023 08:15)      HPI/BRIEF HOSPITAL COURSE: 46yo deaf male BIBEMS found unresponsive and asystolic at home, intubated in field by EMS with ROSC obtained wihtin estimated downtime of 26 minutes. Labs notable for ETOH 286 on arrival, cardiac arrest suspected to be iso possible aspiration event while intoxicated. CTH with diffuse anoxic injury and cerebral edema. Course complicated by dual pressor shock, YINA, transaminitis, metabolic acidosis, and hyperglycemia.      Events last 24 hours:   Remains off sedation     PAST MEDICAL & SURGICAL HISTORY:  Deaf      Deaf      No significant past surgical history        Allergies    fish (Rash)  No Known Drug Allergies    Intolerances      FAMILY HISTORY:  No significant family history        Review of Systems:  CONSTITUTIONAL: No fever, chills, or fatigue  EYES: No eye pain, visual disturbances, or discharge  ENMT:  No difficulty hearing, tinnitus, vertigo; No sinus or throat pain  NECK: No pain or stiffness  RESPIRATORY: No cough, wheezing, chills or hemoptysis; No shortness of breath  CARDIOVASCULAR: No chest pain, palpitations, dizziness, or leg swelling  GASTROINTESTINAL: No abdominal or epigastric pain. No nausea, vomiting, or hematemesis; No diarrhea or constipation. No melena or hematochezia.  GENITOURINARY: No dysuria, frequency, hematuria, or incontinence  NEUROLOGICAL: No headaches, memory loss, loss of strength, numbness, or tremors  SKIN: No itching, burning, rashes, or lesions   MUSCULOSKELETAL: No joint pain or swelling; No muscle, back, or extremity pain  PSYCHIATRIC: No depression, anxiety, mood swings, or difficulty sleeping      Medications:  piperacillin/tazobactam IVPB.. 3.375 Gram(s) IV Intermittent every 12 hours    midodrine 10 milliGRAM(s) Oral every 8 hours          heparin   Injectable 5000 Unit(s) SubCutaneous every 8 hours    pantoprazole  Injectable 40 milliGRAM(s) IV Push daily      dextrose 50% Injectable 25 Gram(s) IV Push once  dextrose 50% Injectable 12.5 Gram(s) IV Push once  dextrose 50% Injectable 25 Gram(s) IV Push once  dextrose Oral Gel 15 Gram(s) Oral once PRN  glucagon  Injectable 1 milliGRAM(s) IntraMuscular once  insulin glargine Injectable (LANTUS) 20 Unit(s) SubCutaneous at bedtime  insulin lispro (ADMELOG) corrective regimen sliding scale   SubCutaneous every 6 hours  vasopressin Infusion 0.04 Unit(s)/Min IV Continuous <Continuous>    dextrose 5%. 1000 milliLiter(s) IV Continuous <Continuous>  dextrose 5%. 1000 milliLiter(s) IV Continuous <Continuous>  folic acid 1 milliGRAM(s) Oral daily  multivitamin/minerals/iron Oral Solution (CENTRUM) 15 milliLiter(s) Oral daily  thiamine 100 milliGRAM(s) Oral daily      chlorhexidine 0.12% Liquid 15 milliLiter(s) Oral Mucosa every 12 hours  chlorhexidine 2% Cloths 1 Application(s) Topical daily        Mode: AC/ CMV (Assist Control/ Continuous Mandatory Ventilation)  RR (machine): 24  TV (machine): 500  FiO2: 60  PEEP: 5  ITime: 1  MAP: 11  PIP: 21      ICU Vital Signs Last 24 Hrs  T(C): 37.3 (16 Jan 2023 08:00), Max: 37.7 (15 Jaxson 2023 16:30)  T(F): 99.1 (16 Jan 2023 08:00), Max: 99.9 (15 Jaxson 2023 16:30)  HR: 103 (16 Jan 2023 08:07) (103 - 117)  BP: 135/78 (16 Jan 2023 08:00) (129/72 - 138/82)  BP(mean): 94 (16 Jan 2023 08:00) (88 - 98)  ABP: 124/73 (16 Jan 2023 08:00) (82/25 - 137/83)  ABP(mean): 93 (16 Jan 2023 08:00) (54 - 103)  RR: 38 (16 Jan 2023 08:00) (21 - 51)  SpO2: 94% (16 Jan 2023 08:07) (92% - 95%)    O2 Parameters below as of 16 Jan 2023 08:00  Patient On (Oxygen Delivery Method): ventilator    O2 Concentration (%): 60      Vital Signs Last 24 Hrs  T(C): 37.3 (16 Jan 2023 08:00), Max: 37.7 (15 Jaxson 2023 16:30)  T(F): 99.1 (16 Jan 2023 08:00), Max: 99.9 (15 Jaxson 2023 16:30)  HR: 103 (16 Jan 2023 08:07) (103 - 117)  BP: 135/78 (16 Jan 2023 08:00) (129/72 - 138/82)  BP(mean): 94 (16 Jan 2023 08:00) (88 - 98)  RR: 38 (16 Jan 2023 08:00) (21 - 51)  SpO2: 94% (16 Jan 2023 08:07) (92% - 95%)    Parameters below as of 16 Jan 2023 08:00  Patient On (Oxygen Delivery Method): ventilator    O2 Concentration (%): 60    ABG - ( 16 Jan 2023 08:20 )  pH, Arterial: 7.580 pH, Blood: x     /  pCO2: 40    /  pO2: 63    / HCO3: 38    / Base Excess: 15.6  /  SaO2: 93.0                I&O's Detail    15 Jaxson 2023 07:01  -  16 Jan 2023 07:00  --------------------------------------------------------  IN:    IV PiggyBack: 50 mL    IV PiggyBack: 100 mL    IV PiggyBack: 225 mL    Vasopressin: 138 mL  Total IN: 513 mL    OUT:    Indwelling Catheter - Urethral (mL): 2525 mL  Total OUT: 2525 mL    Total NET: -2012 mL      16 Jan 2023 07:01  -  16 Jan 2023 09:10  --------------------------------------------------------  IN:    Vasopressin: 12 mL  Total IN: 12 mL    OUT:    Indwelling Catheter - Urethral (mL): 240 mL  Total OUT: 240 mL    Total NET: -228 mL            LABS:                        12.9   13.18 )-----------( 61       ( 16 Jan 2023 04:41 )             36.8     01-16    144  |  95<L>  |  63.6<H>  ----------------------------<  227<H>  3.0<L>   |  33.0<H>  |  5.03<H>    Ca    6.6<L>      16 Jan 2023 04:41  Phos  4.8     01-16  Mg     1.8     01-16    TPro  5.1<L>  /  Alb  2.8<L>  /  TBili  2.9<H>  /  DBili  x   /  AST  529<H>  /  ALT  932<H>  /  AlkPhos  98  01-16          CAPILLARY BLOOD GLUCOSE      POCT Blood Glucose.: 189 mg/dL (16 Jan 2023 06:12)        CULTURES:  Culture Results:   No growth to date. (01-13 @ 19:10)  Culture Results:   No growth to date. (01-13 @ 19:00)  Culture Results:   No growth (01-13 @ 09:50)    piperacillin/tazobactam IVPB.. 3.375 Gram(s) IV Intermittent every 12 hours      Physical Examination:  General: No acute distress.  Alert, oriented, interactive, nonfocal  NEURO: A&O X3, motor function 5/5 BL UE/LE  HEENT: Pupils equal, reactive to light.  Symmetric.  PULM: CTA BL, no significant sputum production, no wheezes, rales, rhonchi  CVS: Regular rate and rhythm, no murmurs, rubs, or gallops  ABD: Soft, nondistended, nontender, normoactive bowel sounds, no masses  EXT: No edema, nontender  SKIN: Warm and well perfused, no rashes noted      RADIOLOGY: ***                 CC:  Patient is a 47y old  Male who presents with a chief complaint of Cardiac arrest     (15 Jaxson 2023 08:15)      HPI/BRIEF HOSPITAL COURSE: 46yo deaf male BIBEMS found unresponsive and asystolic at home, intubated in field by EMS with ROSC obtained wihtin estimated downtime of 26 minutes. Labs notable for ETOH 286 on arrival, cardiac arrest suspected to be iso possible aspiration event while intoxicated. CTH with diffuse anoxic injury and cerebral edema. Course complicated by dual pressor shock, YINA, transaminitis, metabolic acidosis, and hyperglycemia.      Events last 24 hours:   -Remains off sedation no improvement in mentation  -on vaso weaning as tolerated  -Started midodrine PO to wean off pressors  -started tube feeds (1/13)    PAST MEDICAL & SURGICAL HISTORY:  Deaf      Deaf      No significant past surgical history        Allergies    fish (Rash)  No Known Drug Allergies    Intolerances      FAMILY HISTORY:  No significant family history        Review of Systems: Unable to answer due to mental status    CONSTITUTIONAL: No fever, chills, or fatigue  EYES: No eye pain, visual disturbances, or discharge  ENMT:  No difficulty hearing, tinnitus, vertigo; No sinus or throat pain  NECK: No pain or stiffness  RESPIRATORY: No cough, wheezing, chills or hemoptysis; No shortness of breath  CARDIOVASCULAR: No chest pain, palpitations, dizziness, or leg swelling  GASTROINTESTINAL: No abdominal or epigastric pain. No nausea, vomiting, or hematemesis; No diarrhea or constipation. No melena or hematochezia.  GENITOURINARY: No dysuria, frequency, hematuria, or incontinence  NEUROLOGICAL: No headaches, memory loss, loss of strength, numbness, or tremors  SKIN: No itching, burning, rashes, or lesions   MUSCULOSKELETAL: No joint pain or swelling; No muscle, back, or extremity pain  PSYCHIATRIC: No depression, anxiety, mood swings, or difficulty sleeping      Medications:  piperacillin/tazobactam IVPB.. 3.375 Gram(s) IV Intermittent every 12 hours    midodrine 10 milliGRAM(s) Oral every 8 hours          heparin   Injectable 5000 Unit(s) SubCutaneous every 8 hours    pantoprazole  Injectable 40 milliGRAM(s) IV Push daily      dextrose 50% Injectable 25 Gram(s) IV Push once  dextrose 50% Injectable 12.5 Gram(s) IV Push once  dextrose 50% Injectable 25 Gram(s) IV Push once  dextrose Oral Gel 15 Gram(s) Oral once PRN  glucagon  Injectable 1 milliGRAM(s) IntraMuscular once  insulin glargine Injectable (LANTUS) 20 Unit(s) SubCutaneous at bedtime  insulin lispro (ADMELOG) corrective regimen sliding scale   SubCutaneous every 6 hours  vasopressin Infusion 0.04 Unit(s)/Min IV Continuous <Continuous>    dextrose 5%. 1000 milliLiter(s) IV Continuous <Continuous>  dextrose 5%. 1000 milliLiter(s) IV Continuous <Continuous>  folic acid 1 milliGRAM(s) Oral daily  multivitamin/minerals/iron Oral Solution (CENTRUM) 15 milliLiter(s) Oral daily  thiamine 100 milliGRAM(s) Oral daily      chlorhexidine 0.12% Liquid 15 milliLiter(s) Oral Mucosa every 12 hours  chlorhexidine 2% Cloths 1 Application(s) Topical daily        Mode: AC/ CMV (Assist Control/ Continuous Mandatory Ventilation)  RR (machine): 24  TV (machine): 500  FiO2: 60  PEEP: 5  ITime: 1  MAP: 11  PIP: 21      ICU Vital Signs Last 24 Hrs  T(C): 37.3 (16 Jan 2023 08:00), Max: 37.7 (15 Jaxson 2023 16:30)  T(F): 99.1 (16 Jan 2023 08:00), Max: 99.9 (15 Jaxson 2023 16:30)  HR: 103 (16 Jan 2023 08:07) (103 - 117)  BP: 135/78 (16 Jan 2023 08:00) (129/72 - 138/82)  BP(mean): 94 (16 Jan 2023 08:00) (88 - 98)  ABP: 124/73 (16 Jan 2023 08:00) (82/25 - 137/83)  ABP(mean): 93 (16 Jan 2023 08:00) (54 - 103)  RR: 38 (16 Jan 2023 08:00) (21 - 51)  SpO2: 94% (16 Jan 2023 08:07) (92% - 95%)    O2 Parameters below as of 16 Jan 2023 08:00  Patient On (Oxygen Delivery Method): ventilator    O2 Concentration (%): 60      Vital Signs Last 24 Hrs  T(C): 37.3 (16 Jan 2023 08:00), Max: 37.7 (15 Jaxson 2023 16:30)  T(F): 99.1 (16 Jan 2023 08:00), Max: 99.9 (15 Jaxson 2023 16:30)  HR: 103 (16 Jan 2023 08:07) (103 - 117)  BP: 135/78 (16 Jan 2023 08:00) (129/72 - 138/82)  BP(mean): 94 (16 Jan 2023 08:00) (88 - 98)  RR: 38 (16 Jan 2023 08:00) (21 - 51)  SpO2: 94% (16 Jan 2023 08:07) (92% - 95%)    Parameters below as of 16 Jan 2023 08:00  Patient On (Oxygen Delivery Method): ventilator    O2 Concentration (%): 60    ABG - ( 16 Jan 2023 08:20 )  pH, Arterial: 7.580 pH, Blood: x     /  pCO2: 40    /  pO2: 63    / HCO3: 38    / Base Excess: 15.6  /  SaO2: 93.0                I&O's Detail    15 Jaxson 2023 07:01  -  16 Jan 2023 07:00  --------------------------------------------------------  IN:    IV PiggyBack: 50 mL    IV PiggyBack: 100 mL    IV PiggyBack: 225 mL    Vasopressin: 138 mL  Total IN: 513 mL    OUT:    Indwelling Catheter - Urethral (mL): 2525 mL  Total OUT: 2525 mL    Total NET: -2012 mL      16 Jan 2023 07:01  -  16 Jan 2023 09:10  --------------------------------------------------------  IN:    Vasopressin: 12 mL  Total IN: 12 mL    OUT:    Indwelling Catheter - Urethral (mL): 240 mL  Total OUT: 240 mL    Total NET: -228 mL            LABS:                        12.9   13.18 )-----------( 61       ( 16 Jan 2023 04:41 )             36.8     01-16    144  |  95<L>  |  63.6<H>  ----------------------------<  227<H>  3.0<L>   |  33.0<H>  |  5.03<H>    Ca    6.6<L>      16 Jan 2023 04:41  Phos  4.8     01-16  Mg     1.8     01-16    TPro  5.1<L>  /  Alb  2.8<L>  /  TBili  2.9<H>  /  DBili  x   /  AST  529<H>  /  ALT  932<H>  /  AlkPhos  98  01-16          CAPILLARY BLOOD GLUCOSE      POCT Blood Glucose.: 189 mg/dL (16 Jan 2023 06:12)        CULTURES:  Culture Results:   No growth to date. (01-13 @ 19:10)  Culture Results:   No growth to date. (01-13 @ 19:00)  Culture Results:   No growth (01-13 @ 09:50)    piperacillin/tazobactam IVPB.. 3.375 Gram(s) IV Intermittent every 12 hours      Physical Examination:  General: No acute distress.     NEURO/HEENT: Pupils fixed and dilated. Negative corneal, cough or gag reflexes. Negative purposeful movements. No withdrawal to pain in all extremities     PULM: Breathing comfortable on vent.     CVS: Regular rate and rhythm, no murmurs, rubs, or gallop    ABD: Soft, nondistended, nontender    EXT: No edema, nontender    SKIN: Warm and well perfused, no rashes noted

## 2023-01-16 NOTE — CONSULT NOTE ADULT - SUBJECTIVE AND OBJECTIVE BOX
HPI:  48 yo male with PMH deaf, who presents via EMS after he was found unresponsive at home by family members. Patient was last seen by his wife last night playing video games with his son [phone], active on social media around midnight, but was not seen by anyone at that time. Patient's wife stated to ER staff via  that he did not come to bed last night however he frequently sleeps in another room, therefore this was not out of the ordinary. This morning, she assumed he already went to work but the cat alerted her that something was not right. Patient was found unresponsive in spare bedroom, CPR initiated by one of their sons. Upon EMS arrival, CPR was continued, initial rhythm reportedly asystole. He was intubated in the field with #7.5 ETT. ROSC was obtained but lost pulse, and ROSC regained quickly after CPR was re-initiated. Patient got 5 epi in the field, no shocks, total downtime estimated at 26 minutes. Labs remarkable for YINA Cr 2.0, mild transaminitis, lactate >16.5, pH <6.95. Alcohol level 286. UTox negative. CT brain with diffuse anoxic brain injury and cerebral edema. POCUS with grossly preserved EF, no obvious wall motion abnormalities.    Patient is deaf, no other reported PMHx. Per ex- wife, he may have been here about 6 months ago for a heart problem, but is unaware. Upon asking other family members, they deny cardiac history. Per son, patient does snore with periods of apnea and sounds like he is choking while he sleeps. They have urged him to seek medical evaluation, but he refused.  No known family medical history.     Encounter interpreted by  #[id number] Alva   (13 Jan 2023 12:58)    47M with past medical history as listed admitted 1/13 with cardiac arrest. CT head revealed anoxic injury. Patient with poor neurological exam at bedside but overbreathing the ventilator per Intensive care unit.  Palliative consulted for complex medical decision making in the setting of likely life-limiting illness.     PERTINENT PMH REVIEWED: Yes     PAST MEDICAL & SURGICAL HISTORY:  Deaf  No significant past surgical history    SOCIAL HISTORY:  per family                     Substance history: + EtOH, non smoker                     Admitted from:  home                      Caodaism/spirituality:                    Cultural concerns: none                       Surrogate -  wife, brother, and son     FAMILY HISTORY:  No significant family history    Allergies    fish (Rash)  No Known Drug Allergies    ADVANCE DIRECTIVES/TREATMENT PREFERENCES:  Full code, all aggressive measures desired     Baseline ADLs (prior to admission):  Independent    Karnofsky/Palliative Performance Status Version 2:  %  http://npcrc.org/files/news/palliative_performance_scale_ppsv2.pdf    Present Symptoms:     Dyspnea: none on ventilator   Nausea/Vomiting: No  Anxiety:  No  Depression: unable   Fatigue: unable   Loss of appetite: unable   Constipation: none     Pain: none currently             Character-            Duration-            Effect-            Factors-            Frequency-            Location-            Severity-    Pain AD Score:  http://geriatrictoolkit.[facility]/cog/painad.pdf (press ctrl + left click to view)    Review of Systems: Reviewed                 Unable to obtain due to poor mentation   All others negative    MEDICATIONS  (STANDING):  chlorhexidine 0.12% Liquid 15 milliLiter(s) Oral Mucosa every 12 hours  chlorhexidine 2% Cloths 1 Application(s) Topical daily  dextrose 5%. 1000 milliLiter(s) (100 mL/Hr) IV Continuous <Continuous>  dextrose 5%. 1000 milliLiter(s) (50 mL/Hr) IV Continuous <Continuous>  dextrose 50% Injectable 25 Gram(s) IV Push once  dextrose 50% Injectable 12.5 Gram(s) IV Push once  dextrose 50% Injectable 25 Gram(s) IV Push once  folic acid 1 milliGRAM(s) Oral daily  glucagon  Injectable 1 milliGRAM(s) IntraMuscular once  heparin   Injectable 5000 Unit(s) SubCutaneous every 8 hours  insulin glargine Injectable (LANTUS) 20 Unit(s) SubCutaneous at bedtime  insulin lispro (ADMELOG) corrective regimen sliding scale   SubCutaneous every 6 hours  midodrine 10 milliGRAM(s) Oral every 8 hours  multivitamin/minerals/iron Oral Solution (CENTRUM) 15 milliLiter(s) Oral daily  pantoprazole  Injectable 40 milliGRAM(s) IV Push daily  piperacillin/tazobactam IVPB.. 3.375 Gram(s) IV Intermittent every 12 hours  potassium chloride  20 mEq/100 mL IVPB 20 milliEquivalent(s) IV Intermittent once  thiamine 100 milliGRAM(s) Oral daily  vasopressin Infusion 0.04 Unit(s)/Min (6 mL/Hr) IV Continuous <Continuous>    MEDICATIONS  (PRN):  dextrose Oral Gel 15 Gram(s) Oral once PRN Blood Glucose LESS THAN 70 milliGRAM(s)/deciliter    PHYSICAL EXAM:    Vital Signs Last 24 Hrs  T(C): 37.3 (16 Jan 2023 08:00), Max: 37.7 (15 Jaxson 2023 16:30)  T(F): 99.1 (16 Jan 2023 08:00), Max: 99.9 (15 Jaxson 2023 16:30)  HR: 103 (16 Jan 2023 08:07) (103 - 117)  BP: 135/78 (16 Jan 2023 08:00) (129/72 - 138/82)  BP(mean): 94 (16 Jan 2023 08:00) (88 - 98)  RR: 38 (16 Jan 2023 08:00) (21 - 51)  SpO2: 94% (16 Jan 2023 08:07) (92% - 95%)    Parameters below as of 16 Jan 2023 08:00  Patient On (Oxygen Delivery Method): ventilator    O2 Concentration (%): 60    General: intubated, unresponsive + overbreathes ventilator     HEENT: ET tube    Lungs: comfortable     CV: normal      GI: normal. NG tube     :  arnoldo    MSK: weakness     Neuro: unresponsive + over breaths ventilator     Skin: no rash    LABS:                      12.9   13.18 )-----------( 61       ( 16 Jan 2023 04:41 )             36.8     01-16    143  |  95<L>  |  66.4<H>  ----------------------------<  231<H>  2.9<LL>   |  33.0<H>  |  5.02<H>    Ca    6.7<L>      16 Jan 2023 08:20  Phos  4.5     01-16  Mg     1.8     01-16    TPro  5.2<L>  /  Alb  2.8<L>  /  TBili  2.8<H>  /  DBili  x   /  AST  457<H>  /  ALT  878<H>  /  AlkPhos  101  01-16    I&O's Summary    15 Jaxson 2023 07:01  -  16 Jan 2023 07:00  --------------------------------------------------------  IN: 513 mL / OUT: 2525 mL / NET: -2012 mL    16 Jan 2023 07:01  -  16 Jan 2023 09:50  --------------------------------------------------------  IN: 12 mL / OUT: 240 mL / NET: -228 mL    RADIOLOGY & ADDITIONAL STUDIES:  < from: Xray Chest 1 View-PORTABLE IMMEDIATE (Xray Chest 1 View-PORTABLE IMMEDIATE .) (01.13.23 @ 23:55) >    IMPRESSION:  Right IJ line with tip at the SVC/right atrial junction. No   pneumothorax.    ET and enteric tubes as above.    Low lung volumes.    No significant interval change in left retrocardiac opacity which could   be due to atelectasis or infection.    Trace to small left pleural effusion appears slightly larger on the most   recent image.    --- End of Report ---      BRONSON ISRAEL MD; Attending Radiologist  This document has been electronically signed. Jan 14 2023 10:36AM    < end of copied text >    < from: CT Abdomen and Pelvis No Cont (01.13.23 @ 10:48) >  sixth ribs. Degenerative changes of the spine.      IMPRESSION:  Endotracheal tube in place with bilateral dependent subsegmental   atelectasis.    Bilateral nondisplaced fractures of the anterior third through sixth ribs.    Stomach is mildly distended and there are multiple fluid-filled prominent   small bowel loops in the pelvis, nonspecific.    Fatty infiltration of the liver.    Additional findings as above.      --- End of Report ---        ROSIE ADAN MD; Attending Radiologist  This document has been electronically signed. Jan 13 2023 11:06AM    < end of copied text >    < from: CT Head No Cont (01.13.23 @ 10:47) >  Diffuse sulcal effacement suggesting diffuse cerebral edema and global   hypoxic ischemic injury  No acute intracranial hemorrhage.  Right-sided approach NG tube is noted. Chronic right orbit medial wall   fracturedeformity.  The orbits, mastoid air cells and visualized paranasal sinuses are   unremarkable. The calvarium is intact. Consider MRI as clinically   warranted.    IMPRESSION: Diffuse cerebral edema/global hypoxic ischemic injury.    --- End of Report---      < end of copied text >    Clinical Impression:    Severe diffuse cerebral dysfunction, non-reactive diffusely suppressed.   There were no epileptiform abnormalities recorded.

## 2023-01-16 NOTE — CONSULT NOTE ADULT - ASSESSMENT
47M admitted post cardiac arrest with anoxic brain injury, ventilator dependence, shock on pressors.     #1 s/p cardiac arrest. shock   - grave prognosis for meaningful recovery given poor mental status  - possibly aspiration related event resulting in cardias arrest per Intensive care unit   - requiring pressor support at this time     #2 Anoxic injury  - s/p cardiac arrest  - CT head as above   - EEG as above  - grave prognosis    #3 Ventilator dependence  - poor neurological exam resulting in poor prognosis for meaningful weaning    #4 Encounter for palliative care  - see goals of care

## 2023-01-16 NOTE — PROGRESS NOTE ADULT - ASSESSMENT
NEURO:   Remains off sedation, no improvement in mental status. Negative brainstem function noted on physical exam. CTH and EEG consistent with anoxia. Will require official brain death testing, pending apnea test. Continue thiamine and folate.    CARDIAC:   -on vaso gtt weaning as tolerated  -started midodrine 10mg TID to wean of pressors    RESPIRATORY:   -Currently on full vent support, actively titrating settings to maintain SaO2 >92%.    GI:   -Started Tube feeds. IV PPI daily. LFTs improving, transaminitis attributable to shock on arrival.    :   -YINA likely iso ATN. Continue to trend Cr and monitor I&Os. UOP meeting goal >0.5cc/kg/hr.    ENDO:   -Bridged off insulin gtt to lantus 20U and ISS. FS q6h. Goal BG <180.    ID:   -Remains afebrile, WBC down trending. Continue empiric Zosyn, f/u pending cxs.   -lactate downtrending     HEME: SQH for DVT ppx.    Dispo: Ongoing goals of care

## 2023-01-17 NOTE — PROVIDER CONTACT NOTE (OTHER) - SITUATION
blood pressure 77/51
OT has blood pressure of 84/54 with map of 64
Pt blood pressure 84/54 with MAP of 64.

## 2023-01-17 NOTE — PROVIDER CONTACT NOTE (OTHER) - ACTION/TREATMENT ORDERED:
No new orders at this time. PA at bedside discussing treatment plan with family.
No new orders at this time. PA at bedside discussing treatment plan with family.
No new orders at this time. Providers at the bedside discussing goals of care with family.

## 2023-01-17 NOTE — DISCHARGE NOTE FOR THE EXPIRED PATIENT - HOSPITAL COURSE
48 yo male with PMH deaf, who presented via EMS after he was found unresponsive at home by family members 1/13. Patient was last seen by his wife the previous night playing video games with his son [phone], active on social media around midnight, but was not seen by anyone at that time. Patient's wife stated to ER staff via  that he did not come to bed last night however he frequently sleeps in another room, therefore this was not out of the ordinary. That  morning, she assumed he already went to work but the cat alerted her that something was not right. Patient was found unresponsive in spare bedroom, CPR initiated by one of their sons. Upon EMS arrival, CPR was continued, initial rhythm reportedly asystole. He was intubated in the field with #7.5 ETT. ROSC was obtained but patient lost a pulse again, and ROSC regained quickly after CPR was re-initiated, total downtime estimated at 26 minutes.   In the hospital patient noted to have anoxic encephalopathy, YINA, CT brain with findings consistent with severe anoxia.  Patient progressed to brain death and was declared brain dead on 1/17.  Family was updated using both a  for deaf family members and with a  for brother. They eventually expressed understanding that patient was brain dead and we would withdraw the ventilator that night.

## 2023-01-17 NOTE — PROGRESS NOTE ADULT - SUBJECTIVE AND OBJECTIVE BOX
OVERNIGHT EVENTS: worsening neurological condition     Present Symptoms:            Dyspnea: none on ventilator   Nausea/Vomiting: No  Anxiety:  No  Depression: unable   Fatigue: unable   Loss of appetite: unable   Constipation: none     Pain: none currently             Character-            Duration-            Effect-            Factors-            Frequency-            Location-            Severity-    Pain AD Score:  http://geriatrictoolkit.[facility]/cog/painad.pdf (press ctrl + left click to view)    Review of Systems: Reviewed                 Unable to obtain due to poor mentation   All others negative    MEDICATIONS  (STANDING):  chlorhexidine 0.12% Liquid 15 milliLiter(s) Oral Mucosa every 12 hours  chlorhexidine 2% Cloths 1 Application(s) Topical daily  dextrose 5%. 1000 milliLiter(s) (50 mL/Hr) IV Continuous <Continuous>  dextrose 5%. 1000 milliLiter(s) (100 mL/Hr) IV Continuous <Continuous>  dextrose 50% Injectable 25 Gram(s) IV Push once  dextrose 50% Injectable 12.5 Gram(s) IV Push once  dextrose 50% Injectable 25 Gram(s) IV Push once  folic acid 1 milliGRAM(s) Oral daily  glucagon  Injectable 1 milliGRAM(s) IntraMuscular once  heparin   Injectable 5000 Unit(s) SubCutaneous every 8 hours  insulin glargine Injectable (LANTUS) 20 Unit(s) SubCutaneous at bedtime  insulin lispro (ADMELOG) corrective regimen sliding scale   SubCutaneous every 6 hours  midodrine 10 milliGRAM(s) Oral every 8 hours  multivitamin/minerals/iron Oral Solution (CENTRUM) 15 milliLiter(s) Oral daily  pantoprazole  Injectable 40 milliGRAM(s) IV Push daily  piperacillin/tazobactam IVPB.. 3.375 Gram(s) IV Intermittent every 12 hours  thiamine 100 milliGRAM(s) Oral daily    MEDICATIONS  (PRN):  dextrose Oral Gel 15 Gram(s) Oral once PRN Blood Glucose LESS THAN 70 milliGRAM(s)/deciliter    PHYSICAL EXAM:    Vital Signs Last 24 Hrs  T(C): 36.6 (17 Jan 2023 08:14), Max: 37.3 (16 Jan 2023 12:00)  T(F): 97.9 (17 Jan 2023 08:14), Max: 99.1 (16 Jan 2023 12:00)  HR: 82 (17 Jan 2023 10:00) (73 - 100)  BP: 117/74 (17 Jan 2023 10:00) (117/74 - 154/98)  BP(mean): 87 (17 Jan 2023 10:00) (82 - 114)  RR: 60 (17 Jan 2023 10:00) (18 - 60)  SpO2: 97% (17 Jan 2023 10:00) (94% - 98%)    Parameters below as of 17 Jan 2023 08:00  Patient On (Oxygen Delivery Method): ventilator    General: intubated, unresponsive     HEENT: ET tube    Lungs: comfortable     CV: normal      GI: normal. NG tube     :  arnoldo    MSK: weakness     Neuro: unresponsive + over breaths ventilator     Skin: no rash    LABS:                      12.3   14.18 )-----------( 56       ( 17 Jan 2023 04:18 )             36.0     01-17    147<H>  |  103  |  74.4<H>  ----------------------------<  180<H>  3.2<L>   |  33.0<H>  |  5.86<H>    Ca    7.0<L>      17 Jan 2023 04:18  Phos  5.3     01-17  Mg     2.3     01-17    TPro  5.6<L>  /  Alb  2.5<L>  /  TBili  1.9  /  DBili  x   /  AST  225<H>  /  ALT  586<H>  /  AlkPhos  125<H>  01-17    I&O's Summary    16 Jan 2023 07:01  -  17 Jan 2023 07:00  --------------------------------------------------------  IN: 982 mL / OUT: 2715 mL / NET: -1733 mL    17 Jan 2023 07:01  -  17 Jan 2023 11:05  --------------------------------------------------------  IN: 170 mL / OUT: 0 mL / NET: 170 mL    RADIOLOGY & ADDITIONAL STUDIES:    < from: Xray Chest 1 View-PORTABLE IMMEDIATE (Xray Chest 1 View-PORTABLE IMMEDIATE .) (01.13.23 @ 23:55) >  IMPRESSION:  Right IJ line with tip at the SVC/right atrial junction. No   pneumothorax.    ET and enteric tubes as above.    Low lung volumes.    No significant interval change in left retrocardiac opacity which could   be due to atelectasis or infection.    Trace to small left pleural effusion appears slightly larger on the most   recent image.    --- End of Report ---    BRONSON ISRAEL MD; Attending Radiologist  This document has been electronically signed. Jan 14 2023 10:36AM    < end of copied text >    Clinical Impression:    Severe diffuse cerebral dysfunction, non-reactive diffusely suppressed.   There were no epileptiform abnormalities recorded.      Shanel Lenz M.D.   Epilepsy fellow    -------------------------------------------------------------------------------------------------------  [facility] EEG Reading Room Ph#: [phone]  Epilepsy Answering Service after 5PM and before 8:30AM: Ph#: [phone]    ADVANCE DIRECTIVES/TREATMENT PREFERENCES:  Full code

## 2023-01-17 NOTE — PROGRESS NOTE ADULT - ASSESSMENT
47M admitted post cardiac arrest with anoxic brain injury, ventilator dependence, shock on pressors.     #1 s/p cardiac arrest. shock   - grave prognosis for meaningful recovery given poor mental status  - possibly aspiration related event resulting in cardias arrest per Intensive care unit   - requiring pressor support at this time     #2 Anoxic injury  - s/p cardiac arrest  - CT head as above   - EEG as above  - grave prognosis    #3 Ventilator dependence  - poor neurological exam resulting in poor prognosis for meaningful weaning    #4 Encounter for palliative care  - poor prognosis for meaningful recovery  - possible brain death testing per Intensive care unit

## 2023-01-19 LAB
CULTURE RESULTS: SIGNIFICANT CHANGE UP
CULTURE RESULTS: SIGNIFICANT CHANGE UP
SPECIMEN SOURCE: SIGNIFICANT CHANGE UP
SPECIMEN SOURCE: SIGNIFICANT CHANGE UP

## 2023-01-27 NOTE — CHART NOTE - NSCHARTNOTEFT_GEN_A_CORE
Met with patient's wife son and daughter at bedside with  via .  Expressed to them that we would not be able to perform brain death testing as patient  is overbreathing the ventilator spontaneously without sedation despite of not having any other obvious brainstem reflexes.  Discussed about poor prognosis with anoxic encephalopathy despite of improving hemodynamic status as well as oxygenation and ventilation on mechanical support.  Family   Overwhelmed to make further decision including DNR.
On exam:  Pupils fixed and dilated, no oculocephalic reflex, no corneal reflex, no response to cold water calorics on either side.  No cough or gag reflex.  No response to noxious stimuli in any extremity.    Apnea test performed for 10 minutes, pCO2 increased by more than 20 mmHg to greater than 122.  Exam and apnea test consistent with brain death.    Time of brain death declaration 13:45.  Will notify family.
Patient extubated with numerous family members at the bedside. [facility] Medical Examiner contacted for case review. They will reach back out to us with their decision.
Family including Wife, brother at bedside updated them with help of  at bedside about probable cause and effect of cardiac arrest and then current clinical updated length and all questions answered. Full code for now. Told them will update more tomorrow unless clinical changes
Palliative care social work note.    Bereavement call made to patients brother. Support and resources offered.
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
## 2023-04-28 NOTE — ED ADULT TRIAGE NOTE - LANGUAGE ASSISTANCE NEEDED
Yes-Patient/Caregiver accepts free interpretation services...
aerobic capacity/endurance/gait, locomotion, and balance

## 2024-01-18 NOTE — DIETITIAN INITIAL EVALUATION ADULT - ORAL INTAKE PTA/DIET HISTORY
pediatric hospitalist, attending Dr. Siddiqui Pt is currently intubated on vent support; unable to interview pt at this time.

## 2025-03-28 NOTE — ED PROVIDER NOTE - CRITICAL CARE ATTENDING CONTRIBUTION TO CARE
[Well Developed] : well developed [Well Nourished] : well nourished [Normal Conjunctiva] : normal conjunctiva [Normal Venous Pressure] : normal venous pressure [Normal S1, S2] : normal S1, S2 [Clear Lung Fields] : clear lung fields [Soft] : abdomen soft [Normal Gait] : normal gait [No Edema] : no edema [No Cyanosis] : no cyanosis [No Rash] : no rash [Moves all extremities] : moves all extremities [Alert and Oriented] : alert and oriented [Normal memory] : normal memory Bisi GARCES DO, have personally provided 60 minutes of critical care time exclusive of time spent on separately billable procedures. Time includes review of laboratory data, radiology results, discussion with consultants, and monitoring for potential decompensation. Interventions were performed as documented above.

## 2025-05-02 NOTE — ED ADULT NURSE NOTE - CHIEF COMPLAINT QUOTE
----- Message from Elsa sent at 5/2/2025  4:04 PM CDT -----  Regarding: Returning call  Type:  Patient Returning CallWho Called:  PtGingero Left Message for Patient:  Beverly the patient know what this is regarding?:  yesBest Call Back Number:  [phone] Information:  
Spoke to pt and scheduled scope. Prep instructions placed in mail to pt and sent to pts mychart. Pt verbalized understanding to all     
Pt arrives intubated with Emeka device in place on standby after ROSC obtained after 26 minutes of CPR. Pt was found by family with unknown downtime. EMS administered 5 epi. Pt moved to  ER and team at bedside upon pts arrival.

## 2025-07-14 NOTE — ED ADULT NURSE NOTE - PRO INTERPRETER NEED 2
Problem: Knowledge Deficit  Goal: Patient/family/caregiver demonstrates understanding of disease process, treatment plan, medications, and discharge instructions  Description: Complete learning assessment and assess knowledge base.  Interventions:  - Provide teaching at level of understanding  - Provide teaching via preferred learning methods  Outcome: Progressing      [place]